# Patient Record
Sex: FEMALE | Race: WHITE | NOT HISPANIC OR LATINO | Employment: FULL TIME | URBAN - METROPOLITAN AREA
[De-identification: names, ages, dates, MRNs, and addresses within clinical notes are randomized per-mention and may not be internally consistent; named-entity substitution may affect disease eponyms.]

---

## 2023-11-20 ENCOUNTER — LAB (OUTPATIENT)
Dept: LAB | Facility: CLINIC | Age: 28
End: 2023-11-20
Payer: COMMERCIAL

## 2023-11-20 ENCOUNTER — OFFICE VISIT (OUTPATIENT)
Dept: OBGYN | Facility: CLINIC | Age: 28
End: 2023-11-20
Payer: COMMERCIAL

## 2023-11-20 VITALS
HEIGHT: 65 IN | HEART RATE: 99 BPM | DIASTOLIC BLOOD PRESSURE: 75 MMHG | SYSTOLIC BLOOD PRESSURE: 103 MMHG | BODY MASS INDEX: 21.77 KG/M2 | WEIGHT: 130.7 LBS

## 2023-11-20 DIAGNOSIS — Z11.3 ROUTINE SCREENING FOR STI (SEXUALLY TRANSMITTED INFECTION): ICD-10-CM

## 2023-11-20 DIAGNOSIS — Z00.00 VISIT FOR PREVENTIVE HEALTH EXAMINATION: ICD-10-CM

## 2023-11-20 DIAGNOSIS — Z13.220 SCREENING FOR LIPID DISORDERS: ICD-10-CM

## 2023-11-20 DIAGNOSIS — Z13.29 SCREENING FOR THYROID DISORDER: ICD-10-CM

## 2023-11-20 DIAGNOSIS — M79.7 FIBROMYALGIA: ICD-10-CM

## 2023-11-20 DIAGNOSIS — Z13.0 SCREENING FOR DEFICIENCY ANEMIA: ICD-10-CM

## 2023-11-20 DIAGNOSIS — Z12.4 SCREENING FOR CERVICAL CANCER: ICD-10-CM

## 2023-11-20 DIAGNOSIS — Z00.00 VISIT FOR PREVENTIVE HEALTH EXAMINATION: Primary | ICD-10-CM

## 2023-11-20 DIAGNOSIS — R87.619 ABNORMAL CERVICAL PAPANICOLAOU SMEAR, UNSPECIFIED ABNORMAL PAP FINDING: ICD-10-CM

## 2023-11-20 DIAGNOSIS — F32.89 OTHER DEPRESSION: ICD-10-CM

## 2023-11-20 DIAGNOSIS — R63.0 POOR APPETITE: ICD-10-CM

## 2023-11-20 PROBLEM — F32.A DEPRESSION: Status: ACTIVE | Noted: 2023-11-20

## 2023-11-20 LAB
ALBUMIN SERPL BCG-MCNC: 5.2 G/DL (ref 3.5–5.2)
ALP SERPL-CCNC: 55 U/L (ref 40–150)
ALT SERPL W P-5'-P-CCNC: 10 U/L (ref 0–70)
ANION GAP SERPL CALCULATED.3IONS-SCNC: 12 MMOL/L (ref 7–15)
AST SERPL W P-5'-P-CCNC: 15 U/L (ref 0–45)
BILIRUB SERPL-MCNC: 0.6 MG/DL
BUN SERPL-MCNC: 8.3 MG/DL (ref 6–20)
CALCIUM SERPL-MCNC: 10.1 MG/DL (ref 8.6–10)
CHLORIDE SERPL-SCNC: 102 MMOL/L (ref 98–107)
CHOLEST SERPL-MCNC: 169 MG/DL
CREAT SERPL-MCNC: 0.86 MG/DL (ref 0.51–1.17)
DEPRECATED HCO3 PLAS-SCNC: 25 MMOL/L (ref 22–29)
EGFRCR SERPLBLD CKD-EPI 2021: >90 ML/MIN/1.73M2
ERYTHROCYTE [DISTWIDTH] IN BLOOD BY AUTOMATED COUNT: 11.9 % (ref 10–15)
FASTING STATUS PATIENT QL REPORTED: YES
GLUCOSE SERPL-MCNC: 103 MG/DL (ref 70–99)
GLUCOSE SERPL-MCNC: 103 MG/DL (ref 70–99)
HBA1C MFR BLD: 5.2 %
HCT VFR BLD AUTO: 43.2 % (ref 35–53)
HDLC SERPL-MCNC: 78 MG/DL
HGB BLD-MCNC: 14.9 G/DL (ref 11.7–17.7)
LDLC SERPL CALC-MCNC: 79 MG/DL
MCH RBC QN AUTO: 30.7 PG (ref 26.5–33)
MCHC RBC AUTO-ENTMCNC: 34.5 G/DL (ref 31.5–36.5)
MCV RBC AUTO: 89 FL (ref 78–100)
NONHDLC SERPL-MCNC: 91 MG/DL
PLATELET # BLD AUTO: 271 10E3/UL (ref 150–450)
POTASSIUM SERPL-SCNC: 3.8 MMOL/L (ref 3.4–5.3)
PROT SERPL-MCNC: 7.2 G/DL (ref 6.4–8.3)
RBC # BLD AUTO: 4.85 10E6/UL (ref 3.8–5.9)
SODIUM SERPL-SCNC: 139 MMOL/L (ref 135–145)
TRIGL SERPL-MCNC: 58 MG/DL
TSH SERPL DL<=0.005 MIU/L-ACNC: 0.63 UIU/ML (ref 0.3–4.2)
WBC # BLD AUTO: 10.5 10E3/UL (ref 4–11)

## 2023-11-20 PROCEDURE — 99385 PREV VISIT NEW AGE 18-39: CPT | Performed by: REGISTERED NURSE

## 2023-11-20 PROCEDURE — 87340 HEPATITIS B SURFACE AG IA: CPT

## 2023-11-20 PROCEDURE — 87389 HIV-1 AG W/HIV-1&-2 AB AG IA: CPT

## 2023-11-20 PROCEDURE — 82947 ASSAY GLUCOSE BLOOD QUANT: CPT

## 2023-11-20 PROCEDURE — 85027 COMPLETE CBC AUTOMATED: CPT

## 2023-11-20 PROCEDURE — 36415 COLL VENOUS BLD VENIPUNCTURE: CPT

## 2023-11-20 PROCEDURE — 80053 COMPREHEN METABOLIC PANEL: CPT

## 2023-11-20 PROCEDURE — 86803 HEPATITIS C AB TEST: CPT

## 2023-11-20 PROCEDURE — G0145 SCR C/V CYTO,THINLAYER,RESCR: HCPCS | Performed by: REGISTERED NURSE

## 2023-11-20 PROCEDURE — 83036 HEMOGLOBIN GLYCOSYLATED A1C: CPT

## 2023-11-20 PROCEDURE — 86780 TREPONEMA PALLIDUM: CPT

## 2023-11-20 PROCEDURE — 99213 OFFICE O/P EST LOW 20 MIN: CPT | Mod: 25 | Performed by: REGISTERED NURSE

## 2023-11-20 PROCEDURE — 80061 LIPID PANEL: CPT

## 2023-11-20 PROCEDURE — 84443 ASSAY THYROID STIM HORMONE: CPT

## 2023-11-20 RX ORDER — QUETIAPINE FUMARATE 200 MG/1
200 TABLET, FILM COATED ORAL AT BEDTIME
COMMUNITY
Start: 2023-11-12 | End: 2023-11-20

## 2023-11-20 RX ORDER — QUETIAPINE FUMARATE 200 MG/1
200 TABLET, FILM COATED ORAL AT BEDTIME
Qty: 90 TABLET | Refills: 1 | Status: SHIPPED | OUTPATIENT
Start: 2023-11-20

## 2023-11-20 RX ORDER — HYDROXYZINE HYDROCHLORIDE 25 MG/1
25 TABLET, FILM COATED ORAL
COMMUNITY
Start: 2023-09-02 | End: 2024-04-29

## 2023-11-20 RX ORDER — GABAPENTIN 300 MG/1
300 CAPSULE ORAL 3 TIMES DAILY
Qty: 120 CAPSULE | Refills: 3 | Status: SHIPPED | OUTPATIENT
Start: 2023-11-20

## 2023-11-20 RX ORDER — ONDANSETRON 4 MG/1
4 TABLET, ORALLY DISINTEGRATING ORAL EVERY 8 HOURS PRN
Qty: 90 TABLET | Refills: 1 | Status: SHIPPED | OUTPATIENT
Start: 2023-11-20 | End: 2024-04-29

## 2023-11-20 RX ORDER — GABAPENTIN 300 MG/1
300 CAPSULE ORAL
COMMUNITY
Start: 2023-05-07 | End: 2023-11-20

## 2023-11-20 RX ORDER — METHOCARBAMOL 500 MG/1
500 TABLET, FILM COATED ORAL 3 TIMES DAILY PRN
COMMUNITY
Start: 2023-05-14

## 2023-11-20 RX ORDER — MELOXICAM 15 MG/1
15 TABLET ORAL
COMMUNITY
Start: 2023-11-10 | End: 2024-04-29

## 2023-11-20 RX ORDER — ONDANSETRON 4 MG/1
4 TABLET, ORALLY DISINTEGRATING ORAL
Status: ON HOLD | COMMUNITY
Start: 2023-09-02 | End: 2024-04-30

## 2023-11-20 NOTE — PATIENT INSTRUCTIONS
Thank you for trusting us with your care!     If you need to contact us for questions about:  Symptoms, Scheduling & Medical Questions; Non-urgent (2-3 day response) Christianayaz message, Urgent (needing response today) [phone] (if after 3:30pm next day response)   Prescriptions: Please call your Pharmacy   Billing: [facility] [phone] or [facility]:[phone]      PREVENTIVE HEALTH RECOMMENDATIONS:   Most women need a yearly breast and pelvic exam.    A PAP screen, a test done DURING a pelvic exam, is NO longer recommended yearly.    March 2013, screening guidelines recommended by ACOG for PAP screen are:    1) First pap at age 21.    2) Pap every 3 years until age 30.    3) After age 30, pap every 3 years or Pap with HR HPV screen every 5 years until age 65.  4) Women do NOT need a vaginal Pap screen after a hysterectomy (surgical removal of the uterus) when they have not had cancer.    Exceptions:  1) Yearly pap if HIV+ or immunosuppressed secondary to organ transplant  2) ALENA II-III continue routine screening for 20 years.    I encourage you continue looking for opportunities to choose a healthy lifestyle:       * Choose to eat a heart healthy diet. Check out the FOOD PLATE guidelines at: http://www.choosemyplate.gov/ for helpful hints on weight and cholesterol management.  Balance your caloric intake with exercise to maintain a BMI in the 22 to 26 range. For bone health: Eat calcium-rich foods like yogurt, broccoli or take chewable calcium pills (500 to 600 mg) twice a day with food.       * Exercise for at least an average of 30 minutes a day, 5 days of the week. This will help you control your weight, release stress, and help prevent disease.      * Take a Vitamin D3 supplement daily fall through spring and during summer unless you [id number]' full body sun exposure to skin without sunscreen.      * DO wear sunscreen to prevent skin cancer after the first 15-30 minutes.      * Identify stressors in your  life, find ways to release the stress, and, make time for yourself. PLEASE ask for help if mood changes last longer than two weeks.     * Limit alcohol to one drink per day.  No smoking.  Avoid second hand smoke. If you smoke, ask for help to stop.       *  If you are in a sexual relationship, talk with your partner about possible infection risks and take action to protect yourself from exposure to a sexual infection.    Please request an infection screen for STIs (sexually transmitted infections) if you are less than age 26 OR believe that you may be at risk.     Get a flu shot each year. Get a tetanus shot every 10 years. EVERYONE needs a pertussis (Whooping cough) booster.    See your dentist twice a year for an exam and preventive care cleaning.     Consider the following screen tests:    1) cholesterol test every 5 years.     2) yearly mammogram after age 40 unless you have identified risks.    3) colonoscopy every 10 years after age 50 unless you have identified risks.    4) diabetes blood test screening if you are at risk for diabetes.      Additional information that you may also find helpful:  The Internet now gives us access to LOTS of information -- some of it helpful, research documented and also plenty of harmful, anecdotal information that may not pertain to your situtaion. Consider visiting the following websites for accurate health information:    www.vitamindcouncil.org/ : Info and ongoing research re Vitamin D    www.fairview.org : Up to date and easily searchable information on multiple topics.    www.medlineplus.gov : medication info, interactive tutorials, watch real surgeries online    www.cdc.gov : public health info, travel advisories, epidemics (H1N1)    www.darshana/std.org: current research re diagnosis, treatment and prevention of sexually contacted infections.    www.health.state.[place] : [place] dept of heatlh, public health issues in [place], N1N1    www.familydoctor.org : good info from the Academy  of Family Physicians

## 2023-11-20 NOTE — PROGRESS NOTES
"  Progress Note    SUBJECTIVE:  Marck Fernandez is an 28 year old, they/them pronouns. No obstetric history on file., who requests an Annual Preventive Exam.     Moved back in September from [place]!     GYN history:   LMP: 11/13/2023  Regular menses? Yes. Menses monthly. Length of menses: 5-6 days  Sexually active? Yes, female partners only.   STI screening? No concerns. Is agreeable to gc/ct today.  Contraception? None. Does not want to be on hormonal contraception.     Cervical cancer screening: unsure, patient thinks 4-5 years ago.     Concerns today include:   - GI symptoms: They had a break up 3 months ago and has had extreme difficulty with food/drink intake since. They report losing 20lbs in the last 3 months. Symptoms include: nausea, diarrhea and aversion to foods. They are able to tolerate only about 1 meal per day. Sx seem to worsen at night especially. Has zofran which helps some and also uses cannabis periodically as an appetite stimulant, but doesn't want to be dependant upon this. They are aware of the psychosomatic component to the GI distress and loss of appetite and have been looking for a therapist but haven't found one. Open to suggestions. Has tried a myraid of antidepressant medications without success. SNRIs were the best, but had debilitating \"brain zaps\" even when taken strictly on time. Does have support of friends and family which is why they moved back to [place].     - Reviewed hx of fibromyalgia: Uses daily gabapentin, robaxin and  meloxicam for management. Would like to establish with a provider who can continue to manage this condition with her.    - Reviewed they take Seroquel for sleep and mood stabilization which helps. Does not have a provider to manage these medications.     - S/p bilateral mastectomy 12/2018, gender affirming    Age-based screening recommendations:   Cholesterol screening: Lipid panel  Diabetes screening: Hgb A1c; fasting glucose  Thyroid disease screening: TSH " "w/reflex T4    Menstrual History:      11/20/2023     2:00 PM   Menstrual History   LAST MENSTRUAL PERIOD 11/13/2023       Last  No results found for: \"PAP\"  History of abnormal Pap smear: YES - updated in Problem List and Health Maintenance accordingly    Last No results found for: \"HPV16\"  Last No results found for: \"HPV18\"  Last No results found for: \"HRHPV\"    Mammogram current: not applicable  Last Mammogram:   No results found.     Last Colonoscopy:  No results found for this or any previous visit.      HISTORY:  meloxicam (MOBIC) 15 MG tablet, Take 15 mg by mouth  methocarbamol (ROBAXIN) 500 MG tablet, Take 500 mg by mouth  ondansetron (ZOFRAN ODT) 4 MG ODT tab, Take 4 mg by mouth  hydrOXYzine (ATARAX) 25 MG tablet, Take 25 mg by mouth (Patient not taking: Reported on 11/20/2023)    No current facility-administered medications on file prior to visit.    Allergies   Allergen Reactions    Codeine Itching     Immunization History   Administered Date(s) Administered    COVID-19 12+ (2023-24) (MODERNA) 10/02/2023    Influenza,INJ,MDCK,PF,Quad >6mo(Flucelvax) 10/02/2023       OB History   No obstetric history on file.     Past Medical History:   Diagnosis Date    Abnormal Pap smear of cervix     ADHD (attention deficit hyperactivity disorder)     Anxiety     Depression 11/20/2023    Eating disorder     Fibromyalgia 11/20/2023    Irritable bowel syndrome     Trichotillomania      Past Surgical History:   Procedure Laterality Date    top surgery/ double masectomy Bilateral      Family History   Problem Relation Age of Onset    Joint hypermobility Mother     Joint hypermobility Father     Spine Problems Father     Alzheimer Disease Maternal Grandmother     Coronary Artery Disease Maternal Grandfather      Social History     Socioeconomic History    Marital status: Single       ROS  Negative other than noted in HPI       No data to display                   No data to display                  EXAM:  Blood pressure " "103/75, pulse 99, height 1.651 m (5' 5\"), weight 59.3 kg (130 lb 11.2 oz), last menstrual period 11/13/2023. Body mass index is 21.75 kg/m .  General - pleasant female in no acute distress.  Skin - no suspicious lesions or rashes  EENT-  PERRLA, euthyroid with out palpable nodules  Neck - supple without lymphadenopathy.  Lungs - clear to auscultation bilaterally.  Heart - regular rate and rhythm without murmur.  Abdomen - soft, nontender, nondistended, no masses or organomegaly noted.  Musculoskeletal - no gross deformities.  Neurological - normal strength, sensation, and mental status.    Chest Exam:  S/p double mastectomy. Scars well healed. Chest exam without visible skin changes, non-tender with palpation. No dimpling or lesions seen. No nipple discharge. Axillary nodes negative.      Pelvic Exam:  EG/BUS: Normal genital architecture without lesions, erythema or abnormal secretions Bartholin's, Urethra, [place]'s normal  Urethral meatus: normal   Urethra: no masses, tenderness, or scarring   Vagina: moist, pink, rugae with creamy, white, and odorless  secretions  Cervix: pink, moist, closed, without lesion or CMT. Pap collected.   Uterus: bimanual exam deferred. No concerns.   Adnexa: bimanual exam deferred. No concerns.   Rectum:anus normal       ASSESSMENT:  Encounter Diagnoses   Name Primary?    Visit for preventive health examination Yes    Fibromyalgia     Abnormal cervical Papanicolaou smear, unspecified abnormal pap finding     Screening for cervical cancer     Screening for lipid disorders     Routine screening for STI (sexually transmitted infection)     Poor appetite     Screening for thyroid disorder     Other depression     Screening for deficiency anemia         PLAN:   Orders Placed This Encounter   Procedures    Obtaining, preparing and conveyance of cervical or vaginal smear to laboratory.    Lipid Profile    Glucose    Hemoglobin A1c    TSH with free T4 reflex    Comprehensive metabolic panel    " HIV Antigen Antibody Combo    Hepatitis B surface antigen    Hepatitis C antibody    Treponema Abs w Reflex to RPR and Titer    CBC with Platelets    Adult Rheumatology  Referral    Adult Mental Health  Referral     Orders Placed This Encounter   Medications    DISCONTD: gabapentin (NEURONTIN) 300 MG capsule     Sig: Take 300 mg by mouth    hydrOXYzine (ATARAX) 25 MG tablet     Sig: Take 25 mg by mouth    methocarbamol (ROBAXIN) 500 MG tablet     Sig: Take 500 mg by mouth    DISCONTD: QUEtiapine (SEROQUEL) 200 MG tablet     Sig: Take 200 mg by mouth at bedtime Pt takes half of a pill    ondansetron (ZOFRAN ODT) 4 MG ODT tab     Sig: Take 4 mg by mouth    meloxicam (MOBIC) 15 MG tablet     Sig: Take 15 mg by mouth    ondansetron (ZOFRAN ODT) 4 MG ODT tab     Sig: Take 1 tablet (4 mg) by mouth every 8 hours as needed for nausea     Dispense:  90 tablet     Refill:  1    QUEtiapine (SEROQUEL) 200 MG tablet     Sig: Take 1 tablet (200 mg) by mouth at bedtime Pt takes half of a pill     Dispense:  90 tablet     Refill:  1    gabapentin (NEURONTIN) 300 MG capsule     Sig: Take 1 capsule (300 mg) by mouth 3 times daily     Dispense:  120 capsule     Refill:  3     - To lab for screening labs and r/o anemia, hypothyroid or liver dysfunction as source of symptoms. Will follow up based on results.  - Refills provided for medications per patient request to bridge establishing with psychiatrist and therapist. Mental health referral placed and encouraged to schedule with Dr. Palomares if available for therapy.   - Establish with Dr. Moreno or family practice/internal medicine for fibromyalgia management. Also placed referral for rheumatology to establish care if they prefer.   - Discussed mental health symptoms and emotional support provided. Encouraged use of zofran PRN for nausea symptoms and trying small frequent meals/snacking rather than large meals to try and get calories that way. Mood follow up 1 month to  check in.       SHARI JohnsonM

## 2023-11-20 NOTE — NURSING NOTE
Specimen collection error:    Gonorrhea and chlamydia specimen was not collected today. I called the lab to cancel the order.

## 2023-11-21 ENCOUNTER — MYC MEDICAL ADVICE (OUTPATIENT)
Dept: OBGYN | Facility: CLINIC | Age: 28
End: 2023-11-21
Payer: COMMERCIAL

## 2023-11-21 LAB
HBV SURFACE AG SERPL QL IA: NONREACTIVE
HCV AB SERPL QL IA: NONREACTIVE
HIV 1+2 AB+HIV1 P24 AG SERPL QL IA: NONREACTIVE
T PALLIDUM AB SER QL: NONREACTIVE

## 2023-11-23 ENCOUNTER — MYC MEDICAL ADVICE (OUTPATIENT)
Dept: OBGYN | Facility: CLINIC | Age: 28
End: 2023-11-23
Payer: COMMERCIAL

## 2023-11-24 LAB
BKR LAB AP GYN ADEQUACY: NORMAL
BKR LAB AP GYN INTERPRETATION: NORMAL
BKR LAB AP HPV REFLEX: NORMAL
BKR LAB AP PREVIOUS ABNORMAL: NORMAL
PATH REPORT.COMMENTS IMP SPEC: NORMAL
PATH REPORT.COMMENTS IMP SPEC: NORMAL
PATH REPORT.RELEVANT HX SPEC: NORMAL

## 2024-04-29 ENCOUNTER — OFFICE VISIT (OUTPATIENT)
Dept: FAMILY MEDICINE | Facility: CLINIC | Age: 29
End: 2024-04-29
Payer: COMMERCIAL

## 2024-04-29 VITALS
DIASTOLIC BLOOD PRESSURE: 74 MMHG | HEIGHT: 65 IN | OXYGEN SATURATION: 99 % | BODY MASS INDEX: 22.82 KG/M2 | TEMPERATURE: 97.9 F | RESPIRATION RATE: 18 BRPM | SYSTOLIC BLOOD PRESSURE: 119 MMHG | WEIGHT: 137 LBS | HEART RATE: 66 BPM

## 2024-04-29 DIAGNOSIS — Z23 NEED FOR HEPATITIS B VACCINATION: ICD-10-CM

## 2024-04-29 DIAGNOSIS — R11.0 NAUSEA: ICD-10-CM

## 2024-04-29 DIAGNOSIS — F41.9 ANXIETY: ICD-10-CM

## 2024-04-29 DIAGNOSIS — M79.7 FIBROMYALGIA: ICD-10-CM

## 2024-04-29 DIAGNOSIS — Z23 NEED FOR DIPHTHERIA-TETANUS-PERTUSSIS (TDAP) VACCINE: ICD-10-CM

## 2024-04-29 DIAGNOSIS — M25.572 CHRONIC PAIN OF LEFT ANKLE: Primary | ICD-10-CM

## 2024-04-29 DIAGNOSIS — G89.29 CHRONIC PAIN OF LEFT ANKLE: Primary | ICD-10-CM

## 2024-04-29 DIAGNOSIS — F32.89 OTHER DEPRESSION: ICD-10-CM

## 2024-04-29 DIAGNOSIS — F90.9 ATTENTION DEFICIT HYPERACTIVITY DISORDER (ADHD), UNSPECIFIED ADHD TYPE: ICD-10-CM

## 2024-04-29 PROCEDURE — 99204 OFFICE O/P NEW MOD 45 MIN: CPT | Mod: 25 | Performed by: STUDENT IN AN ORGANIZED HEALTH CARE EDUCATION/TRAINING PROGRAM

## 2024-04-29 PROCEDURE — 90746 HEPB VACCINE 3 DOSE ADULT IM: CPT | Performed by: STUDENT IN AN ORGANIZED HEALTH CARE EDUCATION/TRAINING PROGRAM

## 2024-04-29 PROCEDURE — 90471 IMMUNIZATION ADMIN: CPT | Performed by: STUDENT IN AN ORGANIZED HEALTH CARE EDUCATION/TRAINING PROGRAM

## 2024-04-29 RX ORDER — MELOXICAM 15 MG/1
15 TABLET ORAL DAILY PRN
Qty: 30 TABLET | Refills: 3 | Status: SHIPPED | OUTPATIENT
Start: 2024-04-29 | End: 2024-09-03

## 2024-04-29 RX ORDER — QUETIAPINE FUMARATE 100 MG/1
150 TABLET, FILM COATED ORAL EVERY OTHER DAY
COMMUNITY
Start: 2024-04-03

## 2024-04-29 RX ORDER — ONDANSETRON 4 MG/1
4 TABLET, ORALLY DISINTEGRATING ORAL EVERY 8 HOURS PRN
Qty: 90 TABLET | Refills: 1 | Status: SHIPPED | OUTPATIENT
Start: 2024-04-29

## 2024-04-29 RX ORDER — LORAZEPAM 0.5 MG/1
TABLET ORAL
COMMUNITY
Start: 2024-04-03

## 2024-04-29 NOTE — COMMUNITY RESOURCES LIST (ENGLISH)
April 29, 2024           YOUR PERSONALIZED LIST OF SERVICES & PROGRAMS           NAVIGATION    Eligibility Screening      [facility] - SNAP application assistance  [address] [place] [address] (Distance: 1.7 miles)  Phone: (781) 109-2112  Website: http://www.[facility]worker [facility]/human-services/multi-cultural-services  Language: English, Tajik, Gibraltarian, Iraqi, Tanzanian, Georgian, Bahraini, Tamazight, Nepali, Kyrgyz, Swahili  Fee: Free  Accessibility: Ada accessible, Translation services, Deaf or hard of hearing      [facility]  - Public benefits application assistance  [address] [place] [address] (Distance: 1.7 miles)  Phone: (998) 120-9020  Website: http://www.[employer]/[facility]/human-services/multi-cultural-services  Language: English, Tajik, Gibraltarian, Iraqi, Tanzanian, Georgian, Bahraini, Tamazight, Nepali, Kyrgyz, Swahili  Fee: Free  Accessibility: Ada accessible, Translation services, Deaf or hard of hearing      [employer]  Phone: (821) 922-6848  Website: https://www.[facility].org/about-us/assister-program/navigators/index.jsp  Language: English  Hours: Mon 8:00 AM - 4:00 PM Tue 8:00 AM - 4:00 PM Wed 8:00 AM - 4:00 PM Thu 8:00 AM - 4:00 PM        ASSISTANCE    Nutrition Benefits      [facility] application assistance  [address] [place] [address] (Distance: 1.7 miles)  Phone: (529) [phone]  Website: http://www.[facility] [facility]/human-services/multi-cultural-services  Language: English, Tajik, Gibraltarian, Iraqi, Tanzanian, Georgian, Bahraini, Tamazight, Nepali, Kyrgyz, Swahili  Fee: Free  Accessibility: Ada accessible, Translation services, Deaf or hard of hearing      [facility] application assistance - [address] [address] (Distance: 1.7 miles)  Phone: (125) [phone]  Language: English  Fee: Free  Accessibility: Translation services, Ada accessible      [facility]  (formerly food stamps) Screening and Application help  Phone: (513) 545-4337  Website: https://www.hungersolutions.org/programs/[place]-food-helpline/  Language: English  Hours: Mon 10:00 AM - 5:00 PM Tue 10:00 AM - 5:00 PM Wed 10:00 AM - 5:00 PM Thu 10:00 AM - 5:00 PM Fri 10:00 AM - 5:00 PM  Fee: Free  Accessibility: Ada accessible, Blind accommodation, Deaf or hard of hearing, Translation services    Pantry      [facility] - Food pantry  [address] [place] [address] (Distance: 3.7 miles)  Phone: (531) 171-2175  Website: http://www.saintolaf.org/  Language: English  Fee: Free  Accessibility: Ada accessible      [employer] - Food pantry  [address] [place] [address] (Distance: 6.6 miles)  Phone: (106) 792-2373  Website: http://www.[employer].org/  Language: English, Danish  Fee: Free  Accessibility: Ada accessible      [employer]  Phone: (974) 990-5315  Website: https://www.Fishbowl.org/empowerment-food-bank  Language: English  Hours: Mon 9:00 AM - 5:00 PM Tue 9:00 AM - 5:00 PM Wed 9:00 AM - 5:00 PM Thu 9:00 AM - 5:00 PM Fri 9:00 AM - 5:00 PM  Fee: Free               IMPORTANT NUMBERS & WEBSITES        Emergency Services  911  .   [facility]  211 http://211unitedway.org  .   Poison Control  (580) 454-7006 http://[place]poison.org http://wisconsinpoison.org  .     Suicide and Crisis Lifeline  988 http://988lifeline.org  .   Childhelp National Child Abuse Hotline  967.714.6729 http://Childhelphotline.org   .   National Sexual Assault Hotline  (836) 338-7039 (HOPE) http://Rainn.org   .     National Runaway Safeline  (407) 746-1346 (RUNAWAY) http://1800runaOZON.ru.org  .   Pregnancy & Postpartum Support  Call/text 310-743-0495  [place]: http://ppsupportmn.org  [place]: http://[employer].com/wi  .   Substance Abuse National Helpline ([facility])  800-622-HELP (0353) http://Findtreatment.gov   .                DISCLAIMER: These resources have been  generated via the [employer] Platform. [employer]ud does not endorse any service providers mentioned in this resource list. [employer] does not guarantee that the services mentioned in this resource list will be available to you or will improve your health or wellness.    [facility]

## 2024-04-29 NOTE — PROGRESS NOTES
Marck was seen today for office visit.    Diagnoses and all orders for this visit:    Chronic pain of left ankle  Comments:  Sprained ankle approximately a year ago.  Intermittent flareup of pain.  No recent trauma or injury.  Pain is currently controlled.  Low suspicion for bone fracture.  Would like referral to PT.  Try ankle brace.  Orders:  -     REVIEW OF HEALTH MAINTENANCE PROTOCOL ORDERS  -     meloxicam (MOBIC) 15 MG tablet; Take 1 tablet (15 mg) by mouth daily as needed for moderate pain or pain  -     Physical Therapy  Referral; Future    Nausea  Comments:  Reports gut symptoms, poor appetite and nausea, 2/2 trauma/stress.  Orders:  -     ondansetron (ZOFRAN ODT) 4 MG ODT tab; Take 1 tablet (4 mg) by mouth every 8 hours as needed for nausea    Need for hepatitis B vaccination  -     HEPATITIS B, ADULT 20+ (ENGERIX-B/RECOMBIVAX HB)    Need for diphtheria-tetanus-pertussis (Tdap) vaccine  Comments:  Would like to defer.  Will make MA appointment.  Orders:  -     Cancel: TDAP 10-64Y (ADACEL,BOOSTRIX)  -     TDAP 7+ (ADACEL,BOOSTRIX); Future    Anxiety  Other depression  Attention deficit hyperactivity disorder (ADHD), unspecified ADHD type  Comments:  Follows with psychiatrist and therapist.  On quetiapine and lorazepam.  Signed LAURA for psychiatrist and therapist.    Fibromyalgia  Comments:  Muscle relaxer as needed.          Subjective   Marck is a 28 year old, presenting for the following health issues:  office visit ([facility], referral for pt, left ankle pain)        4/29/2024    11:00 AM   Additional Questions   Roomed by    Accompanied by self     History of Present Illness       Reason for visit:  [facility]    Marck eats 2-3 servings of fruits and vegetables daily.Marck consumes 1 sweetened beverage(s) daily.Marck exercises with enough effort to increase Marck's heart rate 30 to 60 minutes per day.  Marck exercises with enough effort to increase Marck's heart rate 3 or less days per week.  "  Marck is taking medications regularly.     Left ankle pain  1 year ago, stepped off moving truck, was bruised and was on crutches for a biet, never saw a doctor.   Would like to see PT  Does not think she broke any bones, but didn't get imaging.   No recent trauma or injury    PMH  Fibromyalgia dx 2018  OCD trichotillomania  Anxiety   Depression  ADHD  S/p top surgery 2019 - used to be on testosterone, no longer    Quetiapine 100 or 200mg based on sleep.   Lorazepam prn    Sees psychiatrist and therapy          Objective    /74 (BP Location: Left arm, Patient Position: Sitting, Cuff Size: Adult Regular)   Pulse 66   Temp 97.9  F (36.6  C) (Temporal)   Resp 18   Ht 1.66 m (5' 5.35\")   Wt 62.1 kg (137 lb)   LMP 04/15/2024   SpO2 99%   BMI 22.55 kg/m    Body mass index is 22.55 kg/m .  Physical Exam   General: Well developed, well nourished.  Skin:  Dry without rash.    Head:  Normocephalic-atraumatic.    Eye:  Normal conjunctivae.     Respiratory:  Normal respiratory effort.   Gastrointestinal:  Non-distended.    Musculoskeletal:  No deformity or edema.  Left ankle with normal range of motion, no tenderness to palpation of lateral malleolus, base of fifth metatarsal, posterior edge of medial malleolus, navicular bone.  Neurologic: No focal deficits.          Signed Electronically by: Soni Boyer MD    Prior to immunization administration, verified patients identity using patient s name and date of birth. Please see Immunization Activity for additional information.     Screening Questionnaire for Adult Immunization    Are you sick today?   No   Do you have allergies to medications, food, a vaccine component or latex?   Yes   Have you ever had a serious reaction after receiving a vaccination?   No   Do you have a long-term health problem with heart, lung, kidney, or metabolic disease (e.g., diabetes), asthma, a blood disorder, no spleen, complement component deficiency, a cochlear implant, or a spinal fluid " leak?  Are you on long-term aspirin therapy?   No   Do you have cancer, leukemia, HIV/AIDS, or any other immune system problem?   No   Do you have a parent, brother, or sister with an immune system problem?   No   In the past 3 months, have you taken medications that affect  your immune system, such as prednisone, other steroids, or anticancer drugs; drugs for the treatment of rheumatoid arthritis, Crohn s disease, or psoriasis; or have you had radiation treatments?   No   Have you had a seizure, or a brain or other nervous system problem?   No   During the past year, have you received a transfusion of blood or blood    products, or been given immune (gamma) globulin or antiviral drug?   No   For women: Are you pregnant or is there a chance you could become       pregnant during the next month?   No   Have you received any vaccinations in the past 4 weeks?   No     Immunization questionnaire was positive for at least one answer.  Notified provider.      Patient instructed to remain in clinic for 15 minutes afterwards, and to report any adverse reactions.     Screening performed by Fabian Morgan MA on 4/29/2024 at 11:03 AM.

## 2024-04-30 ENCOUNTER — HOSPITAL ENCOUNTER (INPATIENT)
Facility: CLINIC | Age: 29
Setting detail: SURGERY ADMIT
End: 2024-04-30
Attending: OBSTETRICS & GYNECOLOGY | Admitting: OBSTETRICS & GYNECOLOGY
Payer: COMMERCIAL

## 2024-04-30 ENCOUNTER — HOSPITAL ENCOUNTER (INPATIENT)
Facility: CLINIC | Age: 29
LOS: 1 days | Discharge: HOME | End: 2024-05-01
Attending: STUDENT IN AN ORGANIZED HEALTH CARE EDUCATION/TRAINING PROGRAM | Admitting: OBSTETRICS & GYNECOLOGY
Payer: COMMERCIAL

## 2024-04-30 ENCOUNTER — ANESTHESIA (OUTPATIENT)
Dept: SURGERY | Facility: CLINIC | Age: 29
End: 2024-04-30
Payer: COMMERCIAL

## 2024-04-30 ENCOUNTER — NURSE TRIAGE (OUTPATIENT)
Dept: NURSING | Facility: CLINIC | Age: 29
End: 2024-04-30
Payer: COMMERCIAL

## 2024-04-30 ENCOUNTER — ANESTHESIA EVENT (OUTPATIENT)
Dept: SURGERY | Facility: CLINIC | Age: 29
End: 2024-04-30
Payer: COMMERCIAL

## 2024-04-30 ENCOUNTER — APPOINTMENT (OUTPATIENT)
Dept: CT IMAGING | Facility: CLINIC | Age: 29
End: 2024-04-30
Attending: STUDENT IN AN ORGANIZED HEALTH CARE EDUCATION/TRAINING PROGRAM
Payer: COMMERCIAL

## 2024-04-30 DIAGNOSIS — N83.209 RUPTURED OVARIAN CYST: Primary | ICD-10-CM

## 2024-04-30 LAB
ABO/RH(D): NORMAL
ALBUMIN SERPL BCG-MCNC: 4.4 G/DL (ref 3.5–5.2)
ALP SERPL-CCNC: 48 U/L (ref 40–150)
ALT SERPL W P-5'-P-CCNC: 9 U/L (ref 0–70)
ANION GAP SERPL CALCULATED.3IONS-SCNC: 13 MMOL/L (ref 7–15)
ANTIBODY SCREEN: NEGATIVE
APTT PPP: 24 SECONDS (ref 22–38)
AST SERPL W P-5'-P-CCNC: 15 U/L (ref 0–45)
BASE EXCESS BLDV CALC-SCNC: -3 MMOL/L (ref -3–3)
BASOPHILS # BLD AUTO: 0.1 10E3/UL (ref 0–0.2)
BASOPHILS NFR BLD AUTO: 0 %
BILIRUB SERPL-MCNC: 0.4 MG/DL
BLD PROD TYP BPU: NORMAL
BLOOD COMPONENT TYPE: NORMAL
BUN SERPL-MCNC: 14.3 MG/DL (ref 6–20)
CA-I BLD-MCNC: 4.9 MG/DL (ref 4.4–5.2)
CALCIUM SERPL-MCNC: 8.7 MG/DL (ref 8.6–10)
CHLORIDE SERPL-SCNC: 102 MMOL/L (ref 98–107)
CODING SYSTEM: NORMAL
CPB POCT: NO
CREAT SERPL-MCNC: 0.82 MG/DL (ref 0.51–1.17)
DEPRECATED HCO3 PLAS-SCNC: 23 MMOL/L (ref 22–29)
EGFRCR SERPLBLD CKD-EPI 2021: >90 ML/MIN/1.73M2
EOSINOPHIL # BLD AUTO: 0.1 10E3/UL (ref 0–0.7)
EOSINOPHIL NFR BLD AUTO: 1 %
ERYTHROCYTE [DISTWIDTH] IN BLOOD BY AUTOMATED COUNT: 11.9 % (ref 10–15)
ERYTHROCYTE [DISTWIDTH] IN BLOOD BY AUTOMATED COUNT: 12.9 % (ref 10–15)
FIBRINOGEN PPP-MCNC: 205 MG/DL (ref 170–490)
GLUCOSE BLD-MCNC: 152 MG/DL (ref 70–99)
GLUCOSE SERPL-MCNC: 161 MG/DL (ref 70–99)
HCO3 BLDV-SCNC: 24 MMOL/L (ref 21–28)
HCT VFR BLD AUTO: 33.3 % (ref 35–53)
HCT VFR BLD AUTO: 36.8 % (ref 35–53)
HCT VFR BLD CALC: 36 % (ref 35–53)
HGB BLD-MCNC: 11.3 G/DL (ref 13.3–17.7)
HGB BLD-MCNC: 12.2 G/DL (ref 11.7–17.7)
HGB BLD-MCNC: 12.4 G/DL (ref 13.3–17.7)
IMM GRANULOCYTES # BLD: 0.1 10E3/UL
IMM GRANULOCYTES NFR BLD: 1 %
INR PPP: 1.12 (ref 0.85–1.15)
ISSUE DATE AND TIME: NORMAL
LACTATE SERPL-SCNC: 1.7 MMOL/L (ref 0.7–2)
LACTATE SERPL-SCNC: 2.7 MMOL/L (ref 0.7–2)
LIPASE SERPL-CCNC: 16 U/L (ref 13–60)
LYMPHOCYTES # BLD AUTO: 2.7 10E3/UL (ref 0.8–5.3)
LYMPHOCYTES NFR BLD AUTO: 18 %
MCH RBC QN AUTO: 29.9 PG (ref 26.5–33)
MCH RBC QN AUTO: 30.2 PG (ref 26.5–33)
MCHC RBC AUTO-ENTMCNC: 33.7 G/DL (ref 31.5–36.5)
MCHC RBC AUTO-ENTMCNC: 33.9 G/DL (ref 31.5–36.5)
MCV RBC AUTO: 88 FL (ref 78–100)
MCV RBC AUTO: 90 FL (ref 78–100)
MONOCYTES # BLD AUTO: 0.7 10E3/UL (ref 0–1.3)
MONOCYTES NFR BLD AUTO: 5 %
NEUTROPHILS # BLD AUTO: 10.9 10E3/UL (ref 1.6–8.3)
NEUTROPHILS NFR BLD AUTO: 75 %
NRBC # BLD AUTO: 0 10E3/UL
NRBC BLD AUTO-RTO: 0 /100
PCO2 BLDV: 46 MM HG (ref 40–50)
PH BLDV: 7.32 [PH] (ref 7.32–7.43)
PLATELET # BLD AUTO: 172 10E3/UL (ref 150–450)
PLATELET # BLD AUTO: 235 10E3/UL (ref 150–450)
PO2 BLDV: 26 MM HG (ref 25–47)
POTASSIUM BLD-SCNC: 3.5 MMOL/L (ref 3.4–5.3)
POTASSIUM SERPL-SCNC: 3.5 MMOL/L (ref 3.4–5.3)
PROCALCITONIN SERPL IA-MCNC: 0.03 NG/ML
PROT SERPL-MCNC: 6.2 G/DL (ref 6.4–8.3)
RADIOLOGIST FLAGS: ABNORMAL
RBC # BLD AUTO: 3.78 10E6/UL (ref 3.8–5.9)
RBC # BLD AUTO: 4.1 10E6/UL (ref 3.8–5.9)
SAO2 % BLDV: 42 % (ref 70–75)
SODIUM BLD-SCNC: 139 MMOL/L (ref 135–145)
SODIUM SERPL-SCNC: 138 MMOL/L (ref 135–145)
SPECIMEN EXPIRATION DATE: NORMAL
UNIT ABO/RH: NORMAL
UNIT NUMBER: NORMAL
UNIT STATUS: NORMAL
UNIT TYPE ISBT: 9500
WBC # BLD AUTO: 14.5 10E3/UL (ref 4–11)
WBC # BLD AUTO: 17.8 10E3/UL (ref 4–11)

## 2024-04-30 PROCEDURE — 250N000009 HC RX 250: Performed by: STUDENT IN AN ORGANIZED HEALTH CARE EDUCATION/TRAINING PROGRAM

## 2024-04-30 PROCEDURE — 250N000013 HC RX MED GY IP 250 OP 250 PS 637

## 2024-04-30 PROCEDURE — 85025 COMPLETE CBC W/AUTO DIFF WBC: CPT | Performed by: STUDENT IN AN ORGANIZED HEALTH CARE EDUCATION/TRAINING PROGRAM

## 2024-04-30 PROCEDURE — 96365 THER/PROPH/DIAG IV INF INIT: CPT | Performed by: STUDENT IN AN ORGANIZED HEALTH CARE EDUCATION/TRAINING PROGRAM

## 2024-04-30 PROCEDURE — 87040 BLOOD CULTURE FOR BACTERIA: CPT | Performed by: STUDENT IN AN ORGANIZED HEALTH CARE EDUCATION/TRAINING PROGRAM

## 2024-04-30 PROCEDURE — 999N000141 HC STATISTIC PRE-PROCEDURE NURSING ASSESSMENT: Performed by: OBSTETRICS & GYNECOLOGY

## 2024-04-30 PROCEDURE — 0W9G3ZZ DRAINAGE OF PERITONEAL CAVITY, PERCUTANEOUS APPROACH: ICD-10-PCS | Performed by: OBSTETRICS & GYNECOLOGY

## 2024-04-30 PROCEDURE — 96375 TX/PRO/DX INJ NEW DRUG ADDON: CPT | Performed by: STUDENT IN AN ORGANIZED HEALTH CARE EDUCATION/TRAINING PROGRAM

## 2024-04-30 PROCEDURE — 258N000003 HC RX IP 258 OP 636: Performed by: ANESTHESIOLOGY

## 2024-04-30 PROCEDURE — 76705 ECHO EXAM OF ABDOMEN: CPT | Mod: 26 | Performed by: STUDENT IN AN ORGANIZED HEALTH CARE EDUCATION/TRAINING PROGRAM

## 2024-04-30 PROCEDURE — 83605 ASSAY OF LACTIC ACID: CPT | Performed by: STUDENT IN AN ORGANIZED HEALTH CARE EDUCATION/TRAINING PROGRAM

## 2024-04-30 PROCEDURE — 250N000009 HC RX 250: Performed by: NURSE ANESTHETIST, CERTIFIED REGISTERED

## 2024-04-30 PROCEDURE — 99291 CRITICAL CARE FIRST HOUR: CPT | Mod: 25 | Performed by: STUDENT IN AN ORGANIZED HEALTH CARE EDUCATION/TRAINING PROGRAM

## 2024-04-30 PROCEDURE — 49322 LAPAROSCOPY ASPIRATION: CPT | Performed by: ANESTHESIOLOGY

## 2024-04-30 PROCEDURE — 82040 ASSAY OF SERUM ALBUMIN: CPT | Performed by: STUDENT IN AN ORGANIZED HEALTH CARE EDUCATION/TRAINING PROGRAM

## 2024-04-30 PROCEDURE — P9016 RBC LEUKOCYTES REDUCED: HCPCS

## 2024-04-30 PROCEDURE — 250N000011 HC RX IP 250 OP 636: Performed by: ANESTHESIOLOGY

## 2024-04-30 PROCEDURE — 370N000017 HC ANESTHESIA TECHNICAL FEE, PER MIN: Performed by: OBSTETRICS & GYNECOLOGY

## 2024-04-30 PROCEDURE — 76705 ECHO EXAM OF ABDOMEN: CPT | Performed by: STUDENT IN AN ORGANIZED HEALTH CARE EDUCATION/TRAINING PROGRAM

## 2024-04-30 PROCEDURE — 250N000011 HC RX IP 250 OP 636: Performed by: STUDENT IN AN ORGANIZED HEALTH CARE EDUCATION/TRAINING PROGRAM

## 2024-04-30 PROCEDURE — 85384 FIBRINOGEN ACTIVITY: CPT | Performed by: STUDENT IN AN ORGANIZED HEALTH CARE EDUCATION/TRAINING PROGRAM

## 2024-04-30 PROCEDURE — 74177 CT ABD & PELVIS W/CONTRAST: CPT

## 2024-04-30 PROCEDURE — 360N000077 HC SURGERY LEVEL 4, PER MIN: Performed by: OBSTETRICS & GYNECOLOGY

## 2024-04-30 PROCEDURE — 0WCG4ZZ EXTIRPATION OF MATTER FROM PERITONEAL CAVITY, PERCUTANEOUS ENDOSCOPIC APPROACH: ICD-10-PCS | Performed by: OBSTETRICS & GYNECOLOGY

## 2024-04-30 PROCEDURE — 250N000013 HC RX MED GY IP 250 OP 250 PS 637: Performed by: STUDENT IN AN ORGANIZED HEALTH CARE EDUCATION/TRAINING PROGRAM

## 2024-04-30 PROCEDURE — 86900 BLOOD TYPING SEROLOGIC ABO: CPT | Performed by: STUDENT IN AN ORGANIZED HEALTH CARE EDUCATION/TRAINING PROGRAM

## 2024-04-30 PROCEDURE — 0W3R4ZZ CONTROL BLEEDING IN GENITOURINARY TRACT, PERCUTANEOUS ENDOSCOPIC APPROACH: ICD-10-PCS | Performed by: OBSTETRICS & GYNECOLOGY

## 2024-04-30 PROCEDURE — 250N000025 HC SEVOFLURANE, PER MIN: Performed by: OBSTETRICS & GYNECOLOGY

## 2024-04-30 PROCEDURE — 710N000009 HC RECOVERY PHASE 1, LEVEL 1, PER MIN: Performed by: OBSTETRICS & GYNECOLOGY

## 2024-04-30 PROCEDURE — 84145 PROCALCITONIN (PCT): CPT | Performed by: STUDENT IN AN ORGANIZED HEALTH CARE EDUCATION/TRAINING PROGRAM

## 2024-04-30 PROCEDURE — 99140 ANES COMP EMERGENCY COND: CPT | Performed by: ANESTHESIOLOGY

## 2024-04-30 PROCEDURE — 36415 COLL VENOUS BLD VENIPUNCTURE: CPT | Performed by: STUDENT IN AN ORGANIZED HEALTH CARE EDUCATION/TRAINING PROGRAM

## 2024-04-30 PROCEDURE — 82803 BLOOD GASES ANY COMBINATION: CPT

## 2024-04-30 PROCEDURE — 250N000011 HC RX IP 250 OP 636: Performed by: NURSE ANESTHETIST, CERTIFIED REGISTERED

## 2024-04-30 PROCEDURE — 250N000011 HC RX IP 250 OP 636: Performed by: OBSTETRICS & GYNECOLOGY

## 2024-04-30 PROCEDURE — 84155 ASSAY OF PROTEIN SERUM: CPT | Performed by: STUDENT IN AN ORGANIZED HEALTH CARE EDUCATION/TRAINING PROGRAM

## 2024-04-30 PROCEDURE — 258N000003 HC RX IP 258 OP 636: Performed by: NURSE ANESTHETIST, CERTIFIED REGISTERED

## 2024-04-30 PROCEDURE — 36430 TRANSFUSION BLD/BLD COMPNT: CPT | Performed by: STUDENT IN AN ORGANIZED HEALTH CARE EDUCATION/TRAINING PROGRAM

## 2024-04-30 PROCEDURE — 85027 COMPLETE CBC AUTOMATED: CPT | Performed by: STUDENT IN AN ORGANIZED HEALTH CARE EDUCATION/TRAINING PROGRAM

## 2024-04-30 PROCEDURE — 85730 THROMBOPLASTIN TIME PARTIAL: CPT | Performed by: STUDENT IN AN ORGANIZED HEALTH CARE EDUCATION/TRAINING PROGRAM

## 2024-04-30 PROCEDURE — 96367 TX/PROPH/DG ADDL SEQ IV INF: CPT | Performed by: STUDENT IN AN ORGANIZED HEALTH CARE EDUCATION/TRAINING PROGRAM

## 2024-04-30 PROCEDURE — 49322 LAPAROSCOPY ASPIRATION: CPT | Performed by: NURSE ANESTHETIST, CERTIFIED REGISTERED

## 2024-04-30 PROCEDURE — 272N000001 HC OR GENERAL SUPPLY STERILE: Performed by: OBSTETRICS & GYNECOLOGY

## 2024-04-30 PROCEDURE — 99140 ANES COMP EMERGENCY COND: CPT | Performed by: NURSE ANESTHETIST, CERTIFIED REGISTERED

## 2024-04-30 PROCEDURE — 83690 ASSAY OF LIPASE: CPT | Performed by: STUDENT IN AN ORGANIZED HEALTH CARE EDUCATION/TRAINING PROGRAM

## 2024-04-30 PROCEDURE — 250N000009 HC RX 250: Performed by: OBSTETRICS & GYNECOLOGY

## 2024-04-30 PROCEDURE — 85610 PROTHROMBIN TIME: CPT | Performed by: STUDENT IN AN ORGANIZED HEALTH CARE EDUCATION/TRAINING PROGRAM

## 2024-04-30 RX ORDER — KETOROLAC TROMETHAMINE 15 MG/ML
15 INJECTION, SOLUTION INTRAMUSCULAR; INTRAVENOUS EVERY 6 HOURS
Status: DISCONTINUED | OUTPATIENT
Start: 2024-04-30 | End: 2024-05-01 | Stop reason: HOSPADM

## 2024-04-30 RX ORDER — NALOXONE HYDROCHLORIDE 0.4 MG/ML
0.1 INJECTION, SOLUTION INTRAMUSCULAR; INTRAVENOUS; SUBCUTANEOUS
Status: DISCONTINUED | OUTPATIENT
Start: 2024-04-30 | End: 2024-04-30 | Stop reason: HOSPADM

## 2024-04-30 RX ORDER — MORPHINE SULFATE 4 MG/ML
4 INJECTION, SOLUTION INTRAMUSCULAR; INTRAVENOUS ONCE
Status: COMPLETED | OUTPATIENT
Start: 2024-04-30 | End: 2024-04-30

## 2024-04-30 RX ORDER — NALOXONE HYDROCHLORIDE 0.4 MG/ML
0.2 INJECTION, SOLUTION INTRAMUSCULAR; INTRAVENOUS; SUBCUTANEOUS
Status: DISCONTINUED | OUTPATIENT
Start: 2024-04-30 | End: 2024-05-01 | Stop reason: HOSPADM

## 2024-04-30 RX ORDER — ONDANSETRON 2 MG/ML
INJECTION INTRAMUSCULAR; INTRAVENOUS PRN
Status: DISCONTINUED | OUTPATIENT
Start: 2024-04-30 | End: 2024-04-30

## 2024-04-30 RX ORDER — BUPIVACAINE HYDROCHLORIDE 2.5 MG/ML
INJECTION, SOLUTION INFILTRATION; PERINEURAL PRN
Status: DISCONTINUED | OUTPATIENT
Start: 2024-04-30 | End: 2024-04-30 | Stop reason: HOSPADM

## 2024-04-30 RX ORDER — SODIUM CHLORIDE, SODIUM LACTATE, POTASSIUM CHLORIDE, CALCIUM CHLORIDE 600; 310; 30; 20 MG/100ML; MG/100ML; MG/100ML; MG/100ML
INJECTION, SOLUTION INTRAVENOUS CONTINUOUS
Status: DISCONTINUED | OUTPATIENT
Start: 2024-04-30 | End: 2024-04-30 | Stop reason: HOSPADM

## 2024-04-30 RX ORDER — PROCHLORPERAZINE MALEATE 10 MG
10 TABLET ORAL EVERY 6 HOURS PRN
Status: DISCONTINUED | OUTPATIENT
Start: 2024-04-30 | End: 2024-05-01 | Stop reason: HOSPADM

## 2024-04-30 RX ORDER — LIDOCAINE 40 MG/G
CREAM TOPICAL
Status: DISCONTINUED | OUTPATIENT
Start: 2024-04-30 | End: 2024-04-30 | Stop reason: HOSPADM

## 2024-04-30 RX ORDER — FENTANYL CITRATE 50 UG/ML
50 INJECTION, SOLUTION INTRAMUSCULAR; INTRAVENOUS
Status: DISCONTINUED | OUTPATIENT
Start: 2024-04-30 | End: 2024-05-01 | Stop reason: HOSPADM

## 2024-04-30 RX ORDER — ONDANSETRON 2 MG/ML
4 INJECTION INTRAMUSCULAR; INTRAVENOUS EVERY 6 HOURS PRN
Status: DISCONTINUED | OUTPATIENT
Start: 2024-04-30 | End: 2024-05-01 | Stop reason: HOSPADM

## 2024-04-30 RX ORDER — AMOXICILLIN 250 MG
1 CAPSULE ORAL 2 TIMES DAILY
Status: DISCONTINUED | OUTPATIENT
Start: 2024-04-30 | End: 2024-05-01 | Stop reason: HOSPADM

## 2024-04-30 RX ORDER — GABAPENTIN 300 MG/1
300 CAPSULE ORAL DAILY
Status: DISCONTINUED | OUTPATIENT
Start: 2024-05-01 | End: 2024-05-01 | Stop reason: HOSPADM

## 2024-04-30 RX ORDER — METHOCARBAMOL 500 MG/1
500 TABLET, FILM COATED ORAL 3 TIMES DAILY PRN
Status: DISCONTINUED | OUTPATIENT
Start: 2024-04-30 | End: 2024-05-01 | Stop reason: HOSPADM

## 2024-04-30 RX ORDER — NALOXONE HYDROCHLORIDE 0.4 MG/ML
0.4 INJECTION, SOLUTION INTRAMUSCULAR; INTRAVENOUS; SUBCUTANEOUS
Status: DISCONTINUED | OUTPATIENT
Start: 2024-04-30 | End: 2024-05-01 | Stop reason: HOSPADM

## 2024-04-30 RX ORDER — MELOXICAM 7.5 MG/1
15 TABLET ORAL DAILY PRN
Status: DISCONTINUED | OUTPATIENT
Start: 2024-04-30 | End: 2024-04-30

## 2024-04-30 RX ORDER — LORAZEPAM 0.5 MG/1
0.5 TABLET ORAL
Status: COMPLETED | OUTPATIENT
Start: 2024-04-30 | End: 2024-04-30

## 2024-04-30 RX ORDER — FENTANYL CITRATE 50 UG/ML
INJECTION, SOLUTION INTRAMUSCULAR; INTRAVENOUS PRN
Status: DISCONTINUED | OUTPATIENT
Start: 2024-04-30 | End: 2024-04-30

## 2024-04-30 RX ORDER — FENTANYL CITRATE 50 UG/ML
25 INJECTION, SOLUTION INTRAMUSCULAR; INTRAVENOUS EVERY 5 MIN PRN
Status: DISCONTINUED | OUTPATIENT
Start: 2024-04-30 | End: 2024-04-30 | Stop reason: HOSPADM

## 2024-04-30 RX ORDER — ONDANSETRON 4 MG/1
4 TABLET, ORALLY DISINTEGRATING ORAL EVERY 6 HOURS PRN
Status: DISCONTINUED | OUTPATIENT
Start: 2024-04-30 | End: 2024-05-01 | Stop reason: HOSPADM

## 2024-04-30 RX ORDER — PROPOFOL 10 MG/ML
INJECTION, EMULSION INTRAVENOUS PRN
Status: DISCONTINUED | OUTPATIENT
Start: 2024-04-30 | End: 2024-04-30

## 2024-04-30 RX ORDER — ACETAMINOPHEN 325 MG/1
975 TABLET ORAL EVERY 6 HOURS
Status: DISCONTINUED | OUTPATIENT
Start: 2024-04-30 | End: 2024-05-01 | Stop reason: HOSPADM

## 2024-04-30 RX ORDER — IBUPROFEN 800 MG/1
800 TABLET, FILM COATED ORAL EVERY 6 HOURS PRN
Qty: 30 TABLET | Refills: 0 | Status: SHIPPED | OUTPATIENT
Start: 2024-04-30 | End: 2024-05-15

## 2024-04-30 RX ORDER — DEXAMETHASONE SODIUM PHOSPHATE 4 MG/ML
INJECTION, SOLUTION INTRA-ARTICULAR; INTRALESIONAL; INTRAMUSCULAR; INTRAVENOUS; SOFT TISSUE PRN
Status: DISCONTINUED | OUTPATIENT
Start: 2024-04-30 | End: 2024-04-30

## 2024-04-30 RX ORDER — ACETAMINOPHEN 325 MG/1
975 TABLET ORAL EVERY 6 HOURS PRN
Qty: 50 TABLET | Refills: 0 | Status: SHIPPED | OUTPATIENT
Start: 2024-04-30

## 2024-04-30 RX ORDER — ONDANSETRON 2 MG/ML
4 INJECTION INTRAMUSCULAR; INTRAVENOUS EVERY 30 MIN PRN
Status: DISCONTINUED | OUTPATIENT
Start: 2024-04-30 | End: 2024-04-30

## 2024-04-30 RX ORDER — OXYCODONE HYDROCHLORIDE 5 MG/1
5 TABLET ORAL EVERY 4 HOURS PRN
Status: DISCONTINUED | OUTPATIENT
Start: 2024-04-30 | End: 2024-05-01 | Stop reason: HOSPADM

## 2024-04-30 RX ORDER — METRONIDAZOLE 500 MG/100ML
500 INJECTION, SOLUTION INTRAVENOUS EVERY 12 HOURS
Status: DISCONTINUED | OUTPATIENT
Start: 2024-04-30 | End: 2024-05-01

## 2024-04-30 RX ORDER — LIDOCAINE 40 MG/G
CREAM TOPICAL
Status: DISCONTINUED | OUTPATIENT
Start: 2024-04-30 | End: 2024-05-01 | Stop reason: HOSPADM

## 2024-04-30 RX ORDER — ONDANSETRON 2 MG/ML
4 INJECTION INTRAMUSCULAR; INTRAVENOUS ONCE
Status: COMPLETED | OUTPATIENT
Start: 2024-04-30 | End: 2024-04-30

## 2024-04-30 RX ORDER — HYDROMORPHONE HYDROCHLORIDE 1 MG/ML
0.2 INJECTION, SOLUTION INTRAMUSCULAR; INTRAVENOUS; SUBCUTANEOUS
Status: DISCONTINUED | OUTPATIENT
Start: 2024-04-30 | End: 2024-05-01 | Stop reason: HOSPADM

## 2024-04-30 RX ORDER — SODIUM CHLORIDE, SODIUM LACTATE, POTASSIUM CHLORIDE, CALCIUM CHLORIDE 600; 310; 30; 20 MG/100ML; MG/100ML; MG/100ML; MG/100ML
INJECTION, SOLUTION INTRAVENOUS CONTINUOUS
Status: DISCONTINUED | OUTPATIENT
Start: 2024-04-30 | End: 2024-05-01 | Stop reason: HOSPADM

## 2024-04-30 RX ORDER — AMOXICILLIN 250 MG
1-2 CAPSULE ORAL 2 TIMES DAILY
Qty: 30 TABLET | Refills: 0 | Status: SHIPPED | OUTPATIENT
Start: 2024-04-30 | End: 2024-05-15

## 2024-04-30 RX ORDER — SODIUM CHLORIDE, SODIUM LACTATE, POTASSIUM CHLORIDE, CALCIUM CHLORIDE 600; 310; 30; 20 MG/100ML; MG/100ML; MG/100ML; MG/100ML
INJECTION, SOLUTION INTRAVENOUS CONTINUOUS PRN
Status: DISCONTINUED | OUTPATIENT
Start: 2024-04-30 | End: 2024-04-30

## 2024-04-30 RX ORDER — ONDANSETRON 2 MG/ML
4 INJECTION INTRAMUSCULAR; INTRAVENOUS EVERY 30 MIN PRN
Status: DISCONTINUED | OUTPATIENT
Start: 2024-04-30 | End: 2024-04-30 | Stop reason: HOSPADM

## 2024-04-30 RX ORDER — BISACODYL 10 MG
10 SUPPOSITORY, RECTAL RECTAL DAILY PRN
Status: DISCONTINUED | OUTPATIENT
Start: 2024-04-30 | End: 2024-05-01 | Stop reason: HOSPADM

## 2024-04-30 RX ORDER — CALCIUM CARBONATE 500 MG/1
500 TABLET, CHEWABLE ORAL 4 TIMES DAILY PRN
Status: DISCONTINUED | OUTPATIENT
Start: 2024-04-30 | End: 2024-05-01 | Stop reason: HOSPADM

## 2024-04-30 RX ORDER — ACETAMINOPHEN 325 MG/1
650 TABLET ORAL EVERY 6 HOURS
Status: DISCONTINUED | OUTPATIENT
Start: 2024-05-03 | End: 2024-05-01 | Stop reason: HOSPADM

## 2024-04-30 RX ORDER — HYDRALAZINE HYDROCHLORIDE 20 MG/ML
2.5-5 INJECTION INTRAMUSCULAR; INTRAVENOUS EVERY 10 MIN PRN
Status: DISCONTINUED | OUTPATIENT
Start: 2024-04-30 | End: 2024-04-30 | Stop reason: HOSPADM

## 2024-04-30 RX ORDER — POLYETHYLENE GLYCOL 3350 17 G/17G
17 POWDER, FOR SOLUTION ORAL DAILY PRN
Status: DISCONTINUED | OUTPATIENT
Start: 2024-05-01 | End: 2024-05-01 | Stop reason: HOSPADM

## 2024-04-30 RX ORDER — QUETIAPINE FUMARATE 100 MG/1
300 TABLET, FILM COATED ORAL AT BEDTIME
Status: DISCONTINUED | OUTPATIENT
Start: 2024-04-30 | End: 2024-04-30

## 2024-04-30 RX ORDER — ONDANSETRON 4 MG/1
4 TABLET, ORALLY DISINTEGRATING ORAL EVERY 30 MIN PRN
Status: DISCONTINUED | OUTPATIENT
Start: 2024-04-30 | End: 2024-04-30 | Stop reason: HOSPADM

## 2024-04-30 RX ORDER — ACETAMINOPHEN 325 MG/1
975 TABLET ORAL ONCE
Status: COMPLETED | OUTPATIENT
Start: 2024-04-30 | End: 2024-04-30

## 2024-04-30 RX ORDER — METOPROLOL TARTRATE 1 MG/ML
1-2 INJECTION, SOLUTION INTRAVENOUS EVERY 5 MIN PRN
Status: DISCONTINUED | OUTPATIENT
Start: 2024-04-30 | End: 2024-04-30 | Stop reason: HOSPADM

## 2024-04-30 RX ORDER — CEFTRIAXONE 2 G/1
2 INJECTION, POWDER, FOR SOLUTION INTRAMUSCULAR; INTRAVENOUS EVERY 24 HOURS
Status: DISCONTINUED | OUTPATIENT
Start: 2024-04-30 | End: 2024-05-01

## 2024-04-30 RX ORDER — HYDROMORPHONE HYDROCHLORIDE 1 MG/ML
0.4 INJECTION, SOLUTION INTRAMUSCULAR; INTRAVENOUS; SUBCUTANEOUS EVERY 5 MIN PRN
Status: DISCONTINUED | OUTPATIENT
Start: 2024-04-30 | End: 2024-04-30 | Stop reason: HOSPADM

## 2024-04-30 RX ORDER — PROPOFOL 10 MG/ML
INJECTION, EMULSION INTRAVENOUS CONTINUOUS PRN
Status: DISCONTINUED | OUTPATIENT
Start: 2024-04-30 | End: 2024-04-30

## 2024-04-30 RX ORDER — ONDANSETRON 4 MG/1
4-8 TABLET, ORALLY DISINTEGRATING ORAL EVERY 8 HOURS PRN
Qty: 4 TABLET | Refills: 0 | Status: SHIPPED | OUTPATIENT
Start: 2024-04-30 | End: 2024-05-15

## 2024-04-30 RX ORDER — IOPAMIDOL 755 MG/ML
100 INJECTION, SOLUTION INTRAVASCULAR ONCE
Status: COMPLETED | OUTPATIENT
Start: 2024-04-30 | End: 2024-04-30

## 2024-04-30 RX ORDER — FENTANYL CITRATE 50 UG/ML
50 INJECTION, SOLUTION INTRAMUSCULAR; INTRAVENOUS EVERY 5 MIN PRN
Status: DISCONTINUED | OUTPATIENT
Start: 2024-04-30 | End: 2024-04-30 | Stop reason: HOSPADM

## 2024-04-30 RX ORDER — KETAMINE HYDROCHLORIDE 10 MG/ML
INJECTION INTRAMUSCULAR; INTRAVENOUS PRN
Status: DISCONTINUED | OUTPATIENT
Start: 2024-04-30 | End: 2024-04-30

## 2024-04-30 RX ORDER — HYDROMORPHONE HYDROCHLORIDE 1 MG/ML
0.2 INJECTION, SOLUTION INTRAMUSCULAR; INTRAVENOUS; SUBCUTANEOUS EVERY 5 MIN PRN
Status: DISCONTINUED | OUTPATIENT
Start: 2024-04-30 | End: 2024-04-30 | Stop reason: HOSPADM

## 2024-04-30 RX ORDER — GABAPENTIN 300 MG/1
300 CAPSULE ORAL 3 TIMES DAILY
Status: DISCONTINUED | OUTPATIENT
Start: 2024-04-30 | End: 2024-04-30

## 2024-04-30 RX ADMIN — ONDANSETRON 4 MG: 2 INJECTION INTRAMUSCULAR; INTRAVENOUS at 18:31

## 2024-04-30 RX ADMIN — Medication 30 MG: at 16:04

## 2024-04-30 RX ADMIN — CEFTRIAXONE SODIUM 2 G: 2 INJECTION, POWDER, FOR SOLUTION INTRAMUSCULAR; INTRAVENOUS at 13:47

## 2024-04-30 RX ADMIN — LORAZEPAM 0.5 MG: 0.5 TABLET ORAL at 22:44

## 2024-04-30 RX ADMIN — HYDROMORPHONE HYDROCHLORIDE 1 MG: 1 INJECTION, SOLUTION INTRAMUSCULAR; INTRAVENOUS; SUBCUTANEOUS at 13:44

## 2024-04-30 RX ADMIN — FENTANYL CITRATE 50 MCG: 50 INJECTION INTRAMUSCULAR; INTRAVENOUS at 15:42

## 2024-04-30 RX ADMIN — SENNOSIDES AND DOCUSATE SODIUM 1 TABLET: 50; 8.6 TABLET ORAL at 22:30

## 2024-04-30 RX ADMIN — SODIUM CHLORIDE 34 ML: 9 INJECTION, SOLUTION INTRAVENOUS at 13:31

## 2024-04-30 RX ADMIN — SODIUM CHLORIDE, POTASSIUM CHLORIDE, SODIUM LACTATE AND CALCIUM CHLORIDE: 600; 310; 30; 20 INJECTION, SOLUTION INTRAVENOUS at 19:01

## 2024-04-30 RX ADMIN — FENTANYL CITRATE 50 MCG: 50 INJECTION INTRAMUSCULAR; INTRAVENOUS at 17:56

## 2024-04-30 RX ADMIN — OXYCODONE HYDROCHLORIDE 5 MG: 5 TABLET ORAL at 22:31

## 2024-04-30 RX ADMIN — HYDROMORPHONE HYDROCHLORIDE 0.2 MG: 1 INJECTION, SOLUTION INTRAMUSCULAR; INTRAVENOUS; SUBCUTANEOUS at 20:06

## 2024-04-30 RX ADMIN — PROCHLORPERAZINE EDISYLATE 10 MG: 5 INJECTION INTRAMUSCULAR; INTRAVENOUS at 22:20

## 2024-04-30 RX ADMIN — PROPOFOL 150 MG: 10 INJECTION, EMULSION INTRAVENOUS at 15:46

## 2024-04-30 RX ADMIN — MORPHINE SULFATE 4 MG: 4 INJECTION INTRAVENOUS at 12:59

## 2024-04-30 RX ADMIN — ACETAMINOPHEN 975 MG: 325 TABLET, FILM COATED ORAL at 15:20

## 2024-04-30 RX ADMIN — ONDANSETRON 4 MG: 2 INJECTION INTRAMUSCULAR; INTRAVENOUS at 12:58

## 2024-04-30 RX ADMIN — MIDAZOLAM 2 MG: 1 INJECTION INTRAMUSCULAR; INTRAVENOUS at 15:36

## 2024-04-30 RX ADMIN — SODIUM CHLORIDE, POTASSIUM CHLORIDE, SODIUM LACTATE AND CALCIUM CHLORIDE: 600; 310; 30; 20 INJECTION, SOLUTION INTRAVENOUS at 17:00

## 2024-04-30 RX ADMIN — QUETIAPINE FUMARATE 150 MG: 100 TABLET ORAL at 22:31

## 2024-04-30 RX ADMIN — FENTANYL CITRATE 50 MCG: 50 INJECTION INTRAMUSCULAR; INTRAVENOUS at 15:46

## 2024-04-30 RX ADMIN — SUCCINYLCHOLINE CHLORIDE 80 MG: 20 INJECTION, SOLUTION INTRAMUSCULAR; INTRAVENOUS; PARENTERAL at 15:46

## 2024-04-30 RX ADMIN — DEXAMETHASONE SODIUM PHOSPHATE 8 MG: 4 INJECTION, SOLUTION INTRA-ARTICULAR; INTRALESIONAL; INTRAMUSCULAR; INTRAVENOUS; SOFT TISSUE at 15:36

## 2024-04-30 RX ADMIN — IOPAMIDOL 67 ML: 755 INJECTION, SOLUTION INTRAVENOUS at 13:30

## 2024-04-30 RX ADMIN — SODIUM CHLORIDE, POTASSIUM CHLORIDE, SODIUM LACTATE AND CALCIUM CHLORIDE: 600; 310; 30; 20 INJECTION, SOLUTION INTRAVENOUS at 15:36

## 2024-04-30 RX ADMIN — Medication 50 MG: at 16:04

## 2024-04-30 RX ADMIN — PROPOFOL 30 MCG/KG/MIN: 10 INJECTION, EMULSION INTRAVENOUS at 15:54

## 2024-04-30 RX ADMIN — FENTANYL CITRATE 25 MCG: 50 INJECTION INTRAMUSCULAR; INTRAVENOUS at 17:45

## 2024-04-30 RX ADMIN — HYDROMORPHONE HYDROCHLORIDE 0.5 MG: 1 INJECTION, SOLUTION INTRAMUSCULAR; INTRAVENOUS; SUBCUTANEOUS at 16:17

## 2024-04-30 RX ADMIN — KETOROLAC TROMETHAMINE 15 MG: 15 INJECTION, SOLUTION INTRAMUSCULAR; INTRAVENOUS at 22:42

## 2024-04-30 RX ADMIN — ONDANSETRON 4 MG: 2 INJECTION INTRAMUSCULAR; INTRAVENOUS at 20:08

## 2024-04-30 RX ADMIN — METRONIDAZOLE 500 MG: 500 INJECTION, SOLUTION INTRAVENOUS at 12:27

## 2024-04-30 RX ADMIN — ONDANSETRON 4 MG: 2 INJECTION INTRAMUSCULAR; INTRAVENOUS at 15:36

## 2024-04-30 RX ADMIN — ACETAMINOPHEN 975 MG: 325 TABLET, FILM COATED ORAL at 22:50

## 2024-04-30 RX ADMIN — METRONIDAZOLE 500 MG: 500 INJECTION, SOLUTION INTRAVENOUS at 23:58

## 2024-04-30 RX ADMIN — SUGAMMADEX 200 MG: 100 INJECTION, SOLUTION INTRAVENOUS at 17:05

## 2024-04-30 ASSESSMENT — ACTIVITIES OF DAILY LIVING (ADL)
ADLS_ACUITY_SCORE: 35
ADLS_ACUITY_SCORE: 18
ADLS_ACUITY_SCORE: 35
ADLS_ACUITY_SCORE: 35
ADLS_ACUITY_SCORE: 18
ADLS_ACUITY_SCORE: 35

## 2024-04-30 ASSESSMENT — COLUMBIA-SUICIDE SEVERITY RATING SCALE - C-SSRS
6. HAVE YOU EVER DONE ANYTHING, STARTED TO DO ANYTHING, OR PREPARED TO DO ANYTHING TO END YOUR LIFE?: NO
2. HAVE YOU ACTUALLY HAD ANY THOUGHTS OF KILLING YOURSELF IN THE PAST MONTH?: NO
1. IN THE PAST MONTH, HAVE YOU WISHED YOU WERE DEAD OR WISHED YOU COULD GO TO SLEEP AND NOT WAKE UP?: NO

## 2024-04-30 NOTE — BRIEF OP NOTE
[facility]    Brief Operative Note    Pre-operative diagnosis: Hemorrhagic cyst of right ovary [N83.201]  Post-operative diagnosis Same as pre-operative diagnosis    Procedure: exploration Laparoscopy with evacuation of hemoperitoneum, cauterization of right ovarian cyst, Right - Abdomen    Surgeon: Surgeons and Role:     * Jesusita Mckeon MD - Primary     * Aileen Sevilla MD - Resident - Assisting  Anesthesia: General     Estimated Blood Loss: 1000 mL from 4/30/2024  3:39 PM to 4/30/2024  5:17 PM  IVF: 1000 ml crystalloid  UOP: 600 ml clear urine    Drains: None  Specimens: * No specimens in log *    Findings: Exam under anesthesia with small, mobile, anteverted uterus, no adnexal fullness appreciated. On laparoscopy no evidence of injury from entry, hemoperitoneum with organized clot, approximately 1000 ml. On pelvic inspection, small simple follicle with active bleeding from ovarian serosal defect. Serosa and cyst bed cauterized with good improvement of bleeding noted. Hemoperitoneum evacuated with suction and placement of organized clot into endocatch bag. Surgiflo placed in cyst bed. Excellent hemostasis noted. Patient stable to PACU.    Complications: None.  Implants: * No implants in log *      Aileen Sevilla MD  OB/GYN Resident PGY-4  5:27 PM April 30, 2024

## 2024-04-30 NOTE — PROGRESS NOTES
[id number] arrived to unit from PACU after exploration Laparoscopy with evacuation of hemoperitoneum, cauterization of right ovarian cyst. VS WNL. Wristband printed and applied for patient. Patients partner arrived to unit and is at bedside.

## 2024-04-30 NOTE — ANESTHESIA PROCEDURE NOTES
Airway       Patient location during procedure: OR       Procedure Start/Stop Times: 4/30/2024 3:48 PM  Staff -        CRNA: Delphine Vidales APRN CRNA       Performed By: CRNA  Consent for Airway        Urgency: elective  Indications and Patient Condition       Indications for airway management: charlie-procedural       Induction type:RSI       Mask difficulty assessment: 0 - not attempted    Final Airway Details       Final airway type: endotracheal airway       Successful airway: ETT - single  Endotracheal Airway Details        ETT size (mm): 7.0       Cuffed: yes       Successful intubation technique: direct laryngoscopy       DL Blade Type: Gaines 2       Grade View of Cords: 1       Adjucts: stylet       Position: Center       Measured from: lips       Secured at (cm): 22       Bite block used: None    Post intubation assessment        Placement verified by: capnometry, equal breath sounds and chest rise        Number of attempts at approach: 1       Number of other approaches attempted: 0       Secured with: tape       Ease of procedure: easy       Dentition: Intact and Unchanged    Medication(s) Administered   Medication Administration Time: 4/30/2024 3:48 PM

## 2024-04-30 NOTE — ED TRIAGE NOTES
Abdominal pain and cramps started last night after intercourse. Pale and nausea.     Triage Assessment (Adult)       Row Name 04/30/24 1146          Triage Assessment    Airway WDL WDL        Respiratory WDL    Respiratory WDL WDL        Skin Circulation/Temperature WDL    Skin Circulation/Temperature WDL WDL        Cardiac WDL    Cardiac WDL WDL        Peripheral/Neurovascular WDL    Peripheral Neurovascular WDL WDL        Cognitive/Neuro/Behavioral WDL    Cognitive/Neuro/Behavioral WDL WDL

## 2024-04-30 NOTE — ANESTHESIA PREPROCEDURE EVALUATION
Anesthesia Pre-Procedure Evaluation    Patient: Marck Fernandez   MRN: [id number] : 1995        Procedure : Procedure(s):  Laparoscopic cystectomy ovarian  Laparoscopic salpingo-oophorectomy  Laparotomy exploratory          Past Medical History:   Diagnosis Date    Abnormal Pap smear of cervix     ADHD (attention deficit hyperactivity disorder)     Anxiety     Depression 2023    Eating disorder     Fibromyalgia 2023    Irritable bowel syndrome     Trichotillomania       Past Surgical History:   Procedure Laterality Date    top surgery/ double masectomy Bilateral       Allergies   Allergen Reactions    Codeine Itching      Social History     Tobacco Use    Smoking status: Never     Passive exposure: Never    Smokeless tobacco: Never   Substance Use Topics    Alcohol use: Never      Wt Readings from Last 1 Encounters:   24 62.1 kg (137 lb)        Anesthesia Evaluation            ROS/MED HX  ENT/Pulmonary:  - neg pulmonary ROS     Neurologic: Comment: Fibromyalgia - neg neurologic ROS     Cardiovascular:  - neg cardiovascular ROS     METS/Exercise Tolerance:     Hematologic:  - neg hematologic  ROS     Musculoskeletal:  - neg musculoskeletal ROS     GI/Hepatic:     (+)       Inflammatory bowel disease,             Renal/Genitourinary:       Endo:  - neg endo ROS     Psychiatric/Substance Use: Comment: ADHD    (+)  depression and anxiety   Recreational drug usage: Cannabis.    Infectious Disease:  - neg infectious disease ROS     Malignancy:       Other: Comment: Hemorrhagic right ovarian cyst           Physical Exam    Airway  airway exam normal      Mallampati: I       Respiratory Devices and Support         Dental       (+) Minor Abnormalities - some fillings, tiny chips      Cardiovascular   cardiovascular exam normal          Pulmonary   pulmonary exam normal                OUTSIDE LABS:  CBC:   Lab Results   Component Value Date    WBC 14.5 (H) 2024    WBC 10.5 2023    HGB  "12.2 04/30/2024    HGB 12.4 (L) 04/30/2024    HCT 36 04/30/2024    HCT 36.8 04/30/2024     04/30/2024     11/20/2023     BMP:   Lab Results   Component Value Date     04/30/2024     04/30/2024    POTASSIUM 3.5 04/30/2024    POTASSIUM 3.5 04/30/2024    CHLORIDE 102 04/30/2024    CHLORIDE 102 11/20/2023    CO2 23 04/30/2024    CO2 25 11/20/2023    BUN 14.3 04/30/2024    BUN 8.3 11/20/2023    CR 0.82 04/30/2024    CR 0.86 11/20/2023     (H) 04/30/2024     (H) 04/30/2024     COAGS:   Lab Results   Component Value Date    PTT 24 04/30/2024    INR 1.12 04/30/2024    FIBR 205 04/30/2024     POC: No results found for: \"BGM\", \"HCG\", \"HCGS\"  HEPATIC:   Lab Results   Component Value Date    ALBUMIN 4.4 04/30/2024    PROTTOTAL 6.2 (L) 04/30/2024    ALT 9 04/30/2024    AST 15 04/30/2024    ALKPHOS 48 04/30/2024    BILITOTAL 0.4 04/30/2024     OTHER:   Lab Results   Component Value Date    LACT 2.7 (H) 04/30/2024    A1C 5.2 11/20/2023    TASIA 8.7 04/30/2024    LIPASE 16 04/30/2024    TSH 0.63 11/20/2023       Anesthesia Plan    ASA Status:  2, emergent    NPO Status:  ELEVATED Aspiration Risk/Unknown    Anesthesia Type: General.     - Airway: ETT   Induction: Intravenous, RSI, Propofol.   Maintenance: Balanced.        Consents    Anesthesia Plan(s) and associated risks, benefits, and realistic alternatives discussed. Questions answered and patient/representative(s) expressed understanding.     - Discussed: Risks, Benefits and Alternatives for the PROCEDURE were discussed     - Discussed with:  Patient            Postoperative Care    Pain management: IV analgesics, Oral pain medications, Multi-modal analgesia.   PONV prophylaxis: Ondansetron (or other 5HT-3), Dexamethasone or Solumedrol, Background Propofol Infusion     Comments:               Kendall Garcia MD    I have reviewed the pertinent notes and labs in the chart from the past 30 days and (re)examined the patient.  Any updates or " changes from those notes are reflected in this note.

## 2024-04-30 NOTE — CONSULTS
Gynecology Consult Note    Name: Marck Fernandez    MRN: [id number]   YOB: 1995               Chief Complaint:   Abdominal pain, c/f hemoperitoneum           History of Present Illness:   Marck Fernandez is a 28 year old non-binary person (they/them) who presented to evaluation of severe abdominal pain with associated nausea, vomiting, and lightheadedness today. Pain began last night after having penetrative intercourse, felt like deep pain. Worsened this morning, which is when nausea and vomiting started. They report pain is all over abdomen, comes in waves and sharp, goes all the way up to their sternum and have intermittent shoulder pain as well. Voiding spontaneously, normal bowel function. Has been feeling chills today, but no fevers.     GYN called urgently upon patient arrival to the ED. Initial VS with tachycardia, hypotension, and FAST exam positive for hemoperitoneum. MTP and trauma activation called by ED provider.     Gyn Hx:   - Menses: Regular, monthly, LMP 4/10  - Contraception: none, not sexually active with partners who have sperm  - H/o STIs: not discussed    OB Hx: G0           Past Medical History:     Past Medical History:   Diagnosis Date    Abnormal Pap smear of cervix     ADHD (attention deficit hyperactivity disorder)     Anxiety     Depression 11/20/2023    Eating disorder     Fibromyalgia 11/20/2023    Irritable bowel syndrome     Trichotillomania    Denies any h/o HTN or asthma.         Past Surgical History:     Past Surgical History:   Procedure Laterality Date    top surgery/ double masectomy Bilateral             Social History:     Social History     Tobacco Use    Smoking status: Never     Passive exposure: Never    Smokeless tobacco: Never   Substance Use Topics    Alcohol use: Never            Family History:     Family History   Problem Relation Age of Onset    Joint hypermobility Mother     Joint hypermobility Father     Spine Problems Father     Alzheimer Disease  Maternal Grandmother     Coronary Artery Disease Maternal Grandfather      No family h/o VTE.          Allergies:     Allergies   Allergen Reactions    Codeine Itching            Medications:     Current Facility-Administered Medications   Medication Dose Route Frequency Provider Last Rate Last Admin    cefTRIAXone (ROCEPHIN) 2 g vial to attach to  ml bag for ADULTS or NS 50 ml bag for PEDS  2 g Intravenous Q24H Yandel Love MD   Stopped at 04/30/24 1428    fentaNYL (PF) (SUBLIMAZE) injection 50 mcg  50 mcg Intravenous Q15 Min PRN Asa Ragsdale MD        lactated ringers BOLUS 1,863 mL  30 mL/kg Intravenous Once Yandel Love MD        lactated ringers infusion   Intravenous Continuous Asa Ragsdale MD        lidocaine (LMX4) cream   Topical Q1H PRN Asa Ragsdale MD        lidocaine 1 % 0.1-1 mL  0.1-1 mL Other Q1H PRN Asa Ragsdale MD        metroNIDAZOLE (FLAGYL) infusion 500 mg  500 mg Intravenous Q12H Yandel Love MD   Stopped at 04/30/24 1330    ondansetron (ZOFRAN) injection 4 mg  4 mg Intravenous Q30 Min PRN Yandel Love MD        sodium chloride (PF) 0.9% PF flush 3 mL  3 mL Intracatheter Q8H Asa Ragsdale MD        sodium chloride (PF) 0.9% PF flush 3 mL  3 mL Intracatheter q1 min prn Asa Ragsdale MD        sodium chloride 0.9 % bag 500mL for CT scan flush use  100 mL Intravenous Q1H PRN Yandel Love MD   34 mL at 04/30/24 1331     Current Outpatient Medications   Medication Sig Dispense Refill    gabapentin (NEURONTIN) 300 MG capsule Take 1 capsule (300 mg) by mouth 3 times daily 120 capsule 3    LORazepam (ATIVAN) 0.5 MG tablet TAKE 1 TABLET BY MOUTH ONCE A DAY AS NEEDED FOR INCREASED ANXIETY OR PANIC ATTACK      meloxicam (MOBIC) 15 MG tablet Take 1 tablet (15 mg) by mouth daily as needed for moderate pain or pain 30 tablet 3    methocarbamol (ROBAXIN) 500 MG tablet Take 500 mg by mouth      ondansetron (ZOFRAN ODT) 4 MG ODT tab Take 1 tablet (4 mg) by  mouth every 8 hours as needed for nausea 90 tablet 1    ondansetron (ZOFRAN ODT) 4 MG ODT tab Take 4 mg by mouth      QUEtiapine (SEROQUEL) 100 MG tablet TAKE 1 AND 1/2 TABLETS BY MOUTH DAILY AT BEDTIME      QUEtiapine (SEROQUEL) 200 MG tablet Take 1 tablet (200 mg) by mouth at bedtime Pt takes half of a pill 90 tablet 1             Review of Systems:    ROS: 10 point ROS negative other than those noted above in the HPI.       Physical Exam:     Vitals:    04/30/24 1304 04/30/24 1316 04/30/24 1343 04/30/24 1415   BP: 110/69 105/70 113/63 107/66   Pulse: 69 62  72   Resp: 18 18 16    Temp: 98.5  F (36.9  C) 98.2  F (36.8  C) 97.7  F (36.5  C)    TempSrc: Oral Oral Oral    SpO2: 100% 100% 100% 99%   Weight:       Height:         General: Pale appearing, NAD, in Trendelenburg  Resp: Breathing comfortably on room air   CV: Well perfused, slightly cool extremities  MSK: Moving all extremities  Abdomen: Soft, mildly distended, diffuse tenderness to palpation. No rebound or guarding  : Deferred  Ext: No edema  Neuro: Grossly normal   Skin: No rashes or ecchymoses noted.      Labs/Imaging     Results for orders placed or performed during the hospital encounter of 04/30/24 (from the past 24 hour(s))   POC US ABDOMEN LIMITED    Impression    Bedside FAST (Focused Assessment with Sonography in Trauma), performed and interpreted by me.   Indication: Hypotension    With the patient in Trendelenburg, the RUQ, LUQ and subxiphoid views were examined for intraabdominal and thoracic free fluid and pericardial effusion. With the patient in reverse Trendelenburg, the suprapubic view was examined for intraabdominal free fluid. Image quality was satisfactory..     Findings: Abnormal.  Free fluid, likely bloody, suprapubic   Extended FAST exam (eFAST):   Indication: Hypotension         IMPRESSION:  Positive FAST showing suprapubic free fluid   CBC with platelets differential    Narrative    The following orders were created for panel  "order CBC with platelets differential.  Procedure                               Abnormality         Status                     ---------                               -----------         ------                     CBC with platelets and d...[[id number]]  Abnormal            Final result                 Please view results for these tests on the individual orders.   INR   Result Value Ref Range    INR 1.12 0.85 - 1.15   Comprehensive metabolic panel   Result Value Ref Range    Sodium 138 135 - 145 mmol/L    Potassium 3.5 3.4 - 5.3 mmol/L    Carbon Dioxide (CO2) 23 22 - 29 mmol/L    Anion Gap 13 7 - 15 mmol/L    Urea Nitrogen 14.3 6.0 - 20.0 mg/dL    Creatinine 0.82 0.51 - 1.17 mg/dL    GFR Estimate >90 >60 mL/min/1.73m2    Calcium 8.7 8.6 - 10.0 mg/dL    Chloride 102 98 - 107 mmol/L    Glucose 161 (H) 70 - 99 mg/dL    Alkaline Phosphatase 48 40 - 150 U/L    AST 15 0 - 45 U/L    ALT 9 0 - 70 U/L    Protein Total 6.2 (L) 6.4 - 8.3 g/dL    Albumin 4.4 3.5 - 5.2 g/dL    Bilirubin Total 0.4 <=1.2 mg/dL    Narrative    The generation of reference intervals for this test is currently based on binary male or female sex. If the electronic health record information indicates another gender identity or if Legal Sex is recorded as \"Unknown\", both male and female reference intervals are provided where applicable, and should be considered according to the individual's appropriate clinical context.   Lipase   Result Value Ref Range    Lipase 16 13 - 60 U/L   Lactic acid whole blood   Result Value Ref Range    Lactic Acid 2.7 (H) 0.7 - 2.0 mmol/L   Procalcitonin   Result Value Ref Range    Procalcitonin 0.03 <0.50 ng/mL   CBC with platelets and differential   Result Value Ref Range    WBC Count 14.5 (H) 4.0 - 11.0 10e3/uL    RBC Count 4.10 3.80 - 5.90 10e6/uL    Hemoglobin 12.4 (L) 13.3 - 17.7 g/dL    Hematocrit 36.8 35.0 - 53.0 %    MCV 90 78 - 100 fL    MCH 30.2 26.5 - 33.0 pg    MCHC 33.7 31.5 - 36.5 g/dL    RDW 11.9 10.0 - 15.0 % " "   Platelet Count 235 150 - 450 10e3/uL    % Neutrophils 75 %    % Lymphocytes 18 %    % Monocytes 5 %    % Eosinophils 1 %    % Basophils 0 %    % Immature Granulocytes 1 %    NRBCs per 100 WBC 0 <1 /100    Absolute Neutrophils 10.9 (H) 1.6 - 8.3 10e3/uL    Absolute Lymphocytes 2.7 0.8 - 5.3 10e3/uL    Absolute Monocytes 0.7 0.0 - 1.3 10e3/uL    Absolute Eosinophils 0.1 0.0 - 0.7 10e3/uL    Absolute Basophils 0.1 0.0 - 0.2 10e3/uL    Absolute Immature Granulocytes 0.1 <=0.4 10e3/uL    Absolute NRBCs 0.0 10e3/uL    Narrative    The generation of reference intervals for this test is currently based on binary male or female sex. If the electronic health record information indicates another gender identity or if Legal Sex is recorded as \"Unknown\", both male and female reference intervals are provided where applicable, and should be considered according to the individual's appropriate clinical context.   ABO/Rh type and screen *Canceled*    Narrative    The following orders were created for panel order ABO/Rh type and screen.  Procedure                               Abnormality         Status                     ---------                               -----------         ------                     Adult Type and Screen[[id number]]                                                         Please view results for these tests on the individual orders.   Partial thromboplastin time   Result Value Ref Range    aPTT 24 22 - 38 Seconds   Fibrinogen activity   Result Value Ref Range    Fibrinogen Activity 205 170 - 490 mg/dL   ABO/Rh type and screen    Narrative    The following orders were created for panel order ABO/Rh type and screen.  Procedure                               Abnormality         Status                     ---------                               -----------         ------                     Adult Type and Screen[[id number]]                            Final result                 Please view results for these " "tests on the individual orders.   Adult Type and Screen   Result Value Ref Range    ABO/RH(D) A POS     Antibody Screen Negative Negative    SPECIMEN EXPIRATION DATE [id number]    ABO/Rh type and screen *Canceled*    Narrative    The following orders were created for panel order ABO/Rh type and screen.  Procedure                               Abnormality         Status                     ---------                               -----------         ------                       Please view results for these tests on the individual orders.   iStat Gases Electrolytes ICA Glucose Venous, POCT   Result Value Ref Range    CPB Applied No     Hematocrit POCT 36 35 - 53 %    Calcium, Ionized Whole Blood POCT 4.9 4.4 - 5.2 mg/dL    Glucose Whole Blood POCT 152 (H) 70 - 99 mg/dL    Bicarbonate Venous POCT 24 21 - 28 mmol/L    Hemoglobin POCT 12.2 11.7 - 17.7 g/dL    Potassium POCT 3.5 3.4 - 5.3 mmol/L    Sodium POCT 139 135 - 145 mmol/L    pCO2 Venous POCT 46 40 - 50 mm Hg    pO2 Venous POCT 26 25 - 47 mm Hg    pH Venous POCT 7.32 7.32 - 7.43    O2 Sat, Venous POCT 42 (L) 70 - 75 %    Base Excess/Deficit (+/-) POCT -3.0 -3.0 - 3.0 mmol/L    Narrative    The generation of reference intervals for this test is currently based on binary male or female sex. If the electronic health record information indicates another gender identity or if Legal Sex is recorded as \"Unknown\", both male and female reference intervals are provided where applicable, and should be considered according to the individual's appropriate clinical context.   Prepare red blood cells (unit)   Result Value Ref Range    ISSUE DATE AND TIME 20240430123000     Blood Component Type Red Blood Cells     Product Code V5612N95     Unit Status Issued     Unit Number [id number]     UNIT ABO/RH O-     CODING SYSTEM [id number]     UNIT TYPE ISBT 9500    Prepare red blood cells (unit)   Result Value Ref Range    ISSUE DATE AND TIME 20240430123000     Blood Component Type " Red Blood Cells     Product Code B3405Z83     Unit Status Issued     Unit Number [id number]     UNIT ABO/RH O-     CODING SYSTEM [id number]     UNIT TYPE ISBT 9500    Prepare red blood cells (unit)   Result Value Ref Range    ISSUE DATE AND TIME [id number]     Blood Component Type Red Blood Cells     Product Code B0309T22     Unit Status Transfused     Unit Number [id number]     UNIT ABO/RH O-     CODING SYSTEM [id number]     UNIT TYPE ISBT 9500    CT Abdomen Pelvis w Contrast   Result Value Ref Range    Radiologist flags Hemoperitoneum and active bleeding in the right (AA)     Narrative    CT ABDOMEN AND PELVIS WITH CONTRAST 4/30/2024 1:40 PM    CLINICAL HISTORY: Abdominal pain.    TECHNIQUE: CT scan of the abdomen and pelvis was performed following  injection of IV contrast. Multiplanar reformats were obtained. Dose  reduction techniques were used.    CONTRAST: 67 mL Isovue 370    COMPARISON: None.    FINDINGS:   LOWER CHEST: Small hiatal hernia.    HEPATOBILIARY: No hepatic masses. No calcified gallstones.    PANCREAS: Normal.    SPLEEN: Normal.    ADRENAL GLANDS: Normal.    KIDNEYS/BLADDER: Small amount of air within the urinary bladder may be  related to recent instrumentation. No hydronephrosis.    BOWEL: Moderate amount of stool in the rectum. No bowel obstruction.  No evidence for colitis or diverticulitis. Unremarkable appendix.    LYMPH NODES: No lymphadenopathy.    VASCULATURE: A blush of contrast in the right adnexal region likely  represents active bleeding.    PELVIC ORGANS: Unremarkable.    ADDITIONAL FINDINGS: Moderate amount of free fluid in the abdomen and  pelvis, with high density best seen in the pelvis and right lower  quadrant, consistent with hemoperitoneum.    MUSCULOSKELETAL: Unremarkable.      Impression    IMPRESSION:   Moderate hemoperitoneum, with active bleeding in the right adnexal  region, possibly from a ruptured ovarian cyst.    [Critical Result: Hemoperitoneum and active  bleeding in the right  adnexal region]    Finding was identified on 4/30/2024 1:52 PM.     Dr. Love was contacted by me on 4/30/2024 1:52 PM and verbalized  understanding of the critical result.             Impression and Plan:     Marck Fernandez is a 28 year old G0 non-binary person (they/them) who presented to the ED for acute abdominal pain with associated lightheadedness, nausea, vomiting; found to be tachycardic and hypotensive with positive FAST exam in ED. Stabilized and CT findings concerning for ruptured hemorrhagic cyst. Recommended to go to OR urgently for diagnostic laparoscopy with likely right cystectomy, possible salpingo-oophorectomy, possible laparotomy. Reviewed procedure details and risks including bleeding, infection, injury to other tissues/organs, need for additional procedure reviewed with patient. Questions answered. They are comfortable with plan.       - To OR urgently  - No perioperative abx needed if stays laparoscopic, will give 2g Ancef if laparotomy required  - S/p 2U pRBC in OR, will monitor VS and repeat CBC intra-op vs postop; additional blood products prn   - Anticipate admission overnight for observation      Team OB/GYN Consult pagers: [phone] from 6:30AM to 6:30PM, [phone] from 6PM to 6:30AM.    Patient seen and care plan discussed under supervision of Dr. Mckeon.      Aileen Sevilla MD  OB/GYN Resident PGY-4  3:11 PM April 30, 2024      Appreciate Dr. Sevilla's note above, patient also seen and examined by me. I agree with the note above. I personally counseled the patient and their partner regarding recommendation for surgery and surgical plan/risks/benefits.  Jesusita Mckeon MD

## 2024-04-30 NOTE — ED PROVIDER NOTES
"    [facility] EMERGENCY DEPARTMENT ([facility])    4/30/24      ED PROVIDER NOTE   ED 4   History     Chief Complaint   Patient presents with    Abdominal Pain     Abdominal pain and cramps started last night after intercourse. Pale and nausea.     The history is provided by the patient and medical records.     Marck Fernandez is a 28 year old adult (assigned female at birth, they/them pronouns) s/p bilateral mastectomy who presents to the Emergency Department with pelvic pain and cramping that started last night after vaginal intercourse along with nausea and vomiting today. Patient had receptive vaginal intercourse last night. This was of normal intensity, though of a different angle than usual.  Patient describes the angle as \"being like the G spot\" but the sensation was more intense and painful, like nothing they have ever experienced before.  Patient continued to have this low pelvic discomfort after intercourse had ended and worsened over the course of the night with more cramping.  Patient continued to have low pelvic pain and cramping, new nausea and vomiting that started today as well as pallor.  Patient is not normally this pale.  Patient denies any vaginal bleeding or discharge, and is certain that they are not pregnant.  Patient is on quetiapine, gabapentin meloxicam.  No history of genital gender affirming surgery.  Patient states that the only notable change is that they had a hepatitis B vaccine yesterday after being seen in primary care clinic to establish cares as a new patient.  Patient moved here from [place].    Past Medical History  Past Medical History:   Diagnosis Date    Abnormal Pap smear of cervix     ADHD (attention deficit hyperactivity disorder)     Anxiety     Depression 11/20/2023    Eating disorder     Fibromyalgia 11/20/2023    Irritable bowel syndrome     Trichotillomania      Past Surgical History:   Procedure Laterality Date    top surgery/ double masectomy Bilateral  " "    gabapentin (NEURONTIN) 300 MG capsule  LORazepam (ATIVAN) 0.5 MG tablet  meloxicam (MOBIC) 15 MG tablet  methocarbamol (ROBAXIN) 500 MG tablet  ondansetron (ZOFRAN ODT) 4 MG ODT tab  ondansetron (ZOFRAN ODT) 4 MG ODT tab  QUEtiapine (SEROQUEL) 100 MG tablet  QUEtiapine (SEROQUEL) 200 MG tablet      Allergies   Allergen Reactions    Codeine Itching     Family History  Family History   Problem Relation Age of Onset    Joint hypermobility Mother     Joint hypermobility Father     Spine Problems Father     Alzheimer Disease Maternal Grandmother     Coronary Artery Disease Maternal Grandfather      Social History   Social History     Tobacco Use    Smoking status: Never     Passive exposure: Never    Smokeless tobacco: Never   Vaping Use    Vaping status: Never Used   Substance Use Topics    Alcohol use: Never    Drug use: Yes     Types: Marijuana     Comment: for pain         A medically appropriate review of systems was performed with pertinent positives and negatives noted in the HPI, and all other systems negative.    Physical Exam   BP: (!) 89/58  Pulse: (!) 126  Temp: 97.9  F (36.6  C)  Resp: 20  Height: 165.1 cm (5' 5\")  Weight: 62.1 kg (137 lb)  SpO2: 99 %    Physical Exam  Constitutional:       General: Marck is not in acute distress.     Appearance: Normal appearance. Marck is not diaphoretic.   HENT:      Head: Atraumatic.      Mouth/Throat:      Mouth: Mucous membranes are moist.   Eyes:      General: No scleral icterus.     Conjunctiva/sclera: Conjunctivae normal.   Cardiovascular:      Rate and Rhythm: Normal rate.      Heart sounds: Normal heart sounds.   Pulmonary:      Effort: No respiratory distress.      Breath sounds: Normal breath sounds. No stridor. No wheezing or rhonchi.   Abdominal:      General: Abdomen is flat.      Comments: Abdomen diffusely tender to palpation with guarding   Musculoskeletal:      Cervical back: Neck supple.   Skin:     General: Skin is warm.      Capillary Refill: " Capillary refill takes 2 to 3 seconds.      Coloration: Skin is pale.      Findings: No rash.   Neurological:      Mental Status: Marck is alert.           ED Course, Procedures, & Data     ED Course as of 04/30/24 1420   Tue Apr 30, 2024   1210 Free fluid noted on fast exam   1210 Discussed plan for labs, type and screen, imaging, trauma and gyn consult, MTP   1218 Vital signs have improved on recheck.  Will hold off on giving MTP for now but will obtain 2 units and transfuse with plan to hold MTP just in case   1229 2 units of blood ready for patient, NST being sent to pick it up.   1244 OB is in room, they do no recommend giving 2 units of blood as patient's hemoglobin is 12.      Procedures  Results for orders placed during the hospital encounter of 04/30/24    POC US ABDOMEN LIMITED    Impression  Bedside FAST (Focused Assessment with Sonography in Trauma), performed and interpreted by me.  Indication: Hypotension    With the patient in Trendelenburg, the RUQ, LUQ and subxiphoid views were examined for intraabdominal and thoracic free fluid and pericardial effusion. With the patient in reverse Trendelenburg, the suprapubic view was examined for intraabdominal free fluid. Image quality was satisfactory..    Findings: Abnormal.  Free fluid, likely bloody, suprapubic  Extended FAST exam (eFAST):  Indication: Hypotension        IMPRESSION:  Positive FAST showing suprapubic free fluid               Results for orders placed or performed during the hospital encounter of 04/30/24   CT Abdomen Pelvis w Contrast     Status: Abnormal (Preliminary result)   Result Value Ref Range    Radiologist flags Hemoperitoneum and active bleeding in the right (AA)     Narrative    CT ABDOMEN AND PELVIS WITH CONTRAST 4/30/2024 1:40 PM    CLINICAL HISTORY: Abdominal pain.    TECHNIQUE: CT scan of the abdomen and pelvis was performed following  injection of IV contrast. Multiplanar reformats were obtained. Dose  reduction techniques were  used.    CONTRAST: 67 mL Isovue 370    COMPARISON: None.    FINDINGS:   LOWER CHEST: Small hiatal hernia.    HEPATOBILIARY: No hepatic masses. No calcified gallstones.    PANCREAS: Normal.    SPLEEN: Normal.    ADRENAL GLANDS: Normal.    KIDNEYS/BLADDER: Small amount of air within the urinary bladder may be  related to recent instrumentation. No hydronephrosis.    BOWEL: Moderate amount of stool in the rectum. No bowel obstruction.  No evidence for colitis or diverticulitis. Unremarkable appendix.    LYMPH NODES: No lymphadenopathy.    VASCULATURE: A blush of contrast in the right adnexal region likely  represents active bleeding.    PELVIC ORGANS: Unremarkable.    ADDITIONAL FINDINGS: Moderate amount of free fluid in the abdomen and  pelvis, with high density best seen in the pelvis and right lower  quadrant, consistent with hemoperitoneum.    MUSCULOSKELETAL: Unremarkable.      Impression    IMPRESSION:   Moderate hemoperitoneum, with active bleeding in the right adnexal  region, possibly from a ruptured ovarian cyst.    [Critical Result: Hemoperitoneum and active bleeding in the right  adnexal region]    Finding was identified on 4/30/2024 1:52 PM.     Dr. Love was contacted by me on 4/30/2024 1:52 PM and verbalized  understanding of the critical result.    POC US ABDOMEN LIMITED     Status: None    Impression    Bedside FAST (Focused Assessment with Sonography in Trauma), performed and interpreted by me.   Indication: Hypotension    With the patient in Trendelenburg, the RUQ, LUQ and subxiphoid views were examined for intraabdominal and thoracic free fluid and pericardial effusion. With the patient in reverse Trendelenburg, the suprapubic view was examined for intraabdominal free fluid. Image quality was satisfactory..     Findings: Abnormal.  Free fluid, likely bloody, suprapubic   Extended FAST exam (eFAST):   Indication: Hypotension         IMPRESSION:  Positive FAST showing suprapubic free fluid   INR      "Status: Normal   Result Value Ref Range    INR 1.12 0.85 - 1.15   Comprehensive metabolic panel     Status: Abnormal   Result Value Ref Range    Sodium 138 135 - 145 mmol/L    Potassium 3.5 3.4 - 5.3 mmol/L    Carbon Dioxide (CO2) 23 22 - 29 mmol/L    Anion Gap 13 7 - 15 mmol/L    Urea Nitrogen 14.3 6.0 - 20.0 mg/dL    Creatinine 0.82 0.51 - 1.17 mg/dL    GFR Estimate >90 >60 mL/min/1.73m2    Calcium 8.7 8.6 - 10.0 mg/dL    Chloride 102 98 - 107 mmol/L    Glucose 161 (H) 70 - 99 mg/dL    Alkaline Phosphatase 48 40 - 150 U/L    AST 15 0 - 45 U/L    ALT 9 0 - 70 U/L    Protein Total 6.2 (L) 6.4 - 8.3 g/dL    Albumin 4.4 3.5 - 5.2 g/dL    Bilirubin Total 0.4 <=1.2 mg/dL    Narrative    The generation of reference intervals for this test is currently based on binary male or female sex. If the electronic health record information indicates another gender identity or if Legal Sex is recorded as \"Unknown\", both male and female reference intervals are provided where applicable, and should be considered according to the individual's appropriate clinical context.   Lipase     Status: Normal   Result Value Ref Range    Lipase 16 13 - 60 U/L   Lactic acid whole blood     Status: Abnormal   Result Value Ref Range    Lactic Acid 2.7 (H) 0.7 - 2.0 mmol/L   Procalcitonin     Status: Normal   Result Value Ref Range    Procalcitonin 0.03 <0.50 ng/mL   CBC with platelets and differential     Status: Abnormal   Result Value Ref Range    WBC Count 14.5 (H) 4.0 - 11.0 10e3/uL    RBC Count 4.10 3.80 - 5.90 10e6/uL    Hemoglobin 12.4 (L) 13.3 - 17.7 g/dL    Hematocrit 36.8 35.0 - 53.0 %    MCV 90 78 - 100 fL    MCH 30.2 26.5 - 33.0 pg    MCHC 33.7 31.5 - 36.5 g/dL    RDW 11.9 10.0 - 15.0 %    Platelet Count 235 150 - 450 10e3/uL    % Neutrophils 75 %    % Lymphocytes 18 %    % Monocytes 5 %    % Eosinophils 1 %    % Basophils 0 %    % Immature Granulocytes 1 %    NRBCs per 100 WBC 0 <1 /100    Absolute Neutrophils 10.9 (H) 1.6 - 8.3 " "10e3/uL    Absolute Lymphocytes 2.7 0.8 - 5.3 10e3/uL    Absolute Monocytes 0.7 0.0 - 1.3 10e3/uL    Absolute Eosinophils 0.1 0.0 - 0.7 10e3/uL    Absolute Basophils 0.1 0.0 - 0.2 10e3/uL    Absolute Immature Granulocytes 0.1 <=0.4 10e3/uL    Absolute NRBCs 0.0 10e3/uL    Narrative    The generation of reference intervals for this test is currently based on binary male or female sex. If the electronic health record information indicates another gender identity or if Legal Sex is recorded as \"Unknown\", both male and female reference intervals are provided where applicable, and should be considered according to the individual's appropriate clinical context.   iStat Gases Electrolytes ICA Glucose Venous, POCT     Status: Abnormal   Result Value Ref Range    CPB Applied No     Hematocrit POCT 36 35 - 53 %    Calcium, Ionized Whole Blood POCT 4.9 4.4 - 5.2 mg/dL    Glucose Whole Blood POCT 152 (H) 70 - 99 mg/dL    Bicarbonate Venous POCT 24 21 - 28 mmol/L    Hemoglobin POCT 12.2 11.7 - 17.7 g/dL    Potassium POCT 3.5 3.4 - 5.3 mmol/L    Sodium POCT 139 135 - 145 mmol/L    pCO2 Venous POCT 46 40 - 50 mm Hg    pO2 Venous POCT 26 25 - 47 mm Hg    pH Venous POCT 7.32 7.32 - 7.43    O2 Sat, Venous POCT 42 (L) 70 - 75 %    Base Excess/Deficit (+/-) POCT -3.0 -3.0 - 3.0 mmol/L    Narrative    The generation of reference intervals for this test is currently based on binary male or female sex. If the electronic health record information indicates another gender identity or if Legal Sex is recorded as \"Unknown\", both male and female reference intervals are provided where applicable, and should be considered according to the individual's appropriate clinical context.   Partial thromboplastin time     Status: Normal   Result Value Ref Range    aPTT 24 22 - 38 Seconds   Fibrinogen activity     Status: Normal   Result Value Ref Range    Fibrinogen Activity 205 170 - 490 mg/dL   Prepare red blood cells (unit)     Status: None " (Preliminary result)   Result Value Ref Range    ISSUE DATE AND TIME 20240430123000     Blood Component Type Red Blood Cells     Product Code O3893L12     Unit Status Issued     Unit Number [id number]     UNIT ABO/RH O-     CODING SYSTEM NJXN135     UNIT TYPE ISBT 9500    Prepare red blood cells (unit)     Status: None (Preliminary result)   Result Value Ref Range    ISSUE DATE AND TIME 20240430123000     Blood Component Type Red Blood Cells     Product Code T4726X60     Unit Status Issued     Unit Number [id number]     UNIT ABO/RH O-     CODING SYSTEM [id number]     UNIT TYPE ISBT 9500    Adult Type and Screen     Status: None   Result Value Ref Range    ABO/RH(D) A POS     Antibody Screen Negative Negative    SPECIMEN EXPIRATION DATE 20240503235900    Prepare red blood cells (unit)     Status: None   Result Value Ref Range    ISSUE DATE AND TIME 20240430123000     Blood Component Type Red Blood Cells     Product Code A9234H89     Unit Status Transfused     Unit Number [id number]     UNIT ABO/RH O-     CODING SYSTEM [id number]     UNIT TYPE ISBT 9500    CBC with platelets differential     Status: Abnormal    Narrative    The following orders were created for panel order CBC with platelets differential.  Procedure                               Abnormality         Status                     ---------                               -----------         ------                     CBC with platelets and d...[[id number]]  Abnormal            Final result                 Please view results for these tests on the individual orders.   ABO/Rh type and screen *Canceled*     Status: None ()    Narrative    The following orders were created for panel order ABO/Rh type and screen.  Procedure                               Abnormality         Status                     ---------                               -----------         ------                       Please view results for these tests on the individual orders.   ABO/Rh  type and screen *Canceled*     Status: None ()    Narrative    The following orders were created for panel order ABO/Rh type and screen.  Procedure                               Abnormality         Status                     ---------                               -----------         ------                     Adult Type and Screen[[id number]]                                                         Please view results for these tests on the individual orders.   ABO/Rh type and screen     Status: None    Narrative    The following orders were created for panel order ABO/Rh type and screen.  Procedure                               Abnormality         Status                     ---------                               -----------         ------                     Adult Type and Screen[[id number]]                            Final result                 Please view results for these tests on the individual orders.     Medications   lactated ringers BOLUS 1,863 mL (has no administration in time range)   ondansetron (ZOFRAN) injection 4 mg (has no administration in time range)   cefTRIAXone (ROCEPHIN) 2 g vial to attach to  ml bag for ADULTS or NS 50 ml bag for PEDS (2 g Intravenous $New Bag 4/30/24 1347)   metroNIDAZOLE (FLAGYL) infusion 500 mg (500 mg Intravenous $New Bag 4/30/24 1227)   sodium chloride 0.9 % bag 500mL for CT scan flush use (34 mLs Intravenous $Given 4/30/24 1331)   morphine (PF) injection 4 mg (4 mg Intravenous $Given 4/30/24 1259)   ondansetron (ZOFRAN) injection 4 mg (4 mg Intravenous $Given 4/30/24 1258)   iopamidol (ISOVUE-370) solution 100 mL (67 mLs Intravenous $Given 4/30/24 1330)   HYDROmorphone (DILAUDID) injection 1 mg (1 mg Intravenous $Given 4/30/24 1344)     Labs Ordered and Resulted from Time of ED Arrival to Time of ED Departure   COMPREHENSIVE METABOLIC PANEL - Abnormal       Result Value    Sodium 138      Potassium 3.5      Carbon Dioxide (CO2) 23      Anion Gap 13      Urea  Nitrogen 14.3      Creatinine 0.82      GFR Estimate >90      Calcium 8.7      Chloride 102      Glucose 161 (*)     Alkaline Phosphatase 48      AST 15      ALT 9      Protein Total 6.2 (*)     Albumin 4.4      Bilirubin Total 0.4     LACTIC ACID WHOLE BLOOD - Abnormal    Lactic Acid 2.7 (*)    CBC WITH PLATELETS AND DIFFERENTIAL - Abnormal    WBC Count 14.5 (*)     RBC Count 4.10      Hemoglobin 12.4 (*)     Hematocrit 36.8      MCV 90      MCH 30.2      MCHC 33.7      RDW 11.9      Platelet Count 235      % Neutrophils 75      % Lymphocytes 18      % Monocytes 5      % Eosinophils 1      % Basophils 0      % Immature Granulocytes 1      NRBCs per 100 WBC 0      Absolute Neutrophils 10.9 (*)     Absolute Lymphocytes 2.7      Absolute Monocytes 0.7      Absolute Eosinophils 0.1      Absolute Basophils 0.1      Absolute Immature Granulocytes 0.1      Absolute NRBCs 0.0     ISTAT GASES ELECTROLYTES ICA GLUCOSE VENOUS POCT - Abnormal    CPB Applied No      Hematocrit POCT 36      Calcium, Ionized Whole Blood POCT 4.9      Glucose Whole Blood POCT 152 (*)     Bicarbonate Venous POCT 24      Hemoglobin POCT 12.2      Potassium POCT 3.5      Sodium POCT 139      pCO2 Venous POCT 46      pO2 Venous POCT 26      pH Venous POCT 7.32      O2 Sat, Venous POCT 42 (*)     Base Excess/Deficit (+/-) POCT -3.0     INR - Normal    INR 1.12     LIPASE - Normal    Lipase 16     PROCALCITONIN - Normal    Procalcitonin 0.03     PARTIAL THROMBOPLASTIN TIME - Normal    aPTT 24     FIBRINOGEN ACTIVITY - Normal    Fibrinogen Activity 205     ROUTINE UA WITH MICROSCOPIC REFLEX TO CULTURE   LACTIC ACID WHOLE BLOOD   PREPARE RED BLOOD CELLS (UNIT)    ISSUE DATE AND TIME [id number]      Blood Component Type Red Blood Cells      Product Code A5251E03      Unit Status Issued      Unit Number [id number]      UNIT ABO/RH O-      CODING SYSTEM YGGT089      UNIT TYPE ISBT 9500     PREPARE RED BLOOD CELLS (UNIT)    ISSUE DATE AND TIME  [id number]      Blood Component Type Red Blood Cells      Product Code N9023K49      Unit Status Issued      Unit Number [id number]      UNIT ABO/RH O-      CODING SYSTEM [id number]      UNIT TYPE ISBT 9500     TYPE AND SCREEN, ADULT    ABO/RH(D) A POS      Antibody Screen Negative      SPECIMEN EXPIRATION DATE [id number]     PREPARE RED BLOOD CELLS (UNIT)    ISSUE DATE AND TIME 20240430123000      Blood Component Type Red Blood Cells      Product Code A4902D10      Unit Status Transfused      Unit Number [id number]      UNIT ABO/RH O-      CODING SYSTEM [id number]      UNIT TYPE ISBT 9500     PREPARE RED BLOOD CELLS (UNIT)   BLOOD CULTURE   BLOOD CULTURE   TRANSFUSE RED BLOOD CELLS (UNIT)   ABO/RH TYPE AND SCREEN     CT Abdomen Pelvis w Contrast   Preliminary Result   Abnormal   IMPRESSION:    Moderate hemoperitoneum, with active bleeding in the right adnexal   region, possibly from a ruptured ovarian cyst.      [Critical Result: Hemoperitoneum and active bleeding in the right   adnexal region]      Finding was identified on 4/30/2024 1:52 PM.       Dr. Love was contacted by me on 4/30/2024 1:52 PM and verbalized   understanding of the critical result.       POC US ABDOMEN LIMITED   Final Result   Bedside FAST (Focused Assessment with Sonography in Trauma), performed and interpreted by me.    Indication: Hypotension      With the patient in Trendelenburg, the RUQ, LUQ and subxiphoid views were examined for intraabdominal and thoracic free fluid and pericardial effusion. With the patient in reverse Trendelenburg, the suprapubic view was examined for intraabdominal free fluid. Image quality was satisfactory..       Findings: Abnormal.  Free fluid, likely bloody, suprapubic    Extended FAST exam (eFAST):    Indication: Hypotension             IMPRESSION:  Positive FAST showing suprapubic free fluid             Critical care was performed.   Critical Care Addendum  My initial assessment, based on my review  of prehospital provider report, review of nursing observations, review of vital signs, focused history, physical exam, discussion with OB/GYN, and interpretation of labs, CT scan , established a high suspicion that Marck Fernandez has severe hemorrhage and severe hypotension, which requires immediate intervention, and therefore Marck is critically ill.     After the initial assessment, the care team initiated multiple lab tests, initiated IV fluid administration, and initiated medication therapy with blood, fluids, antibiotics  to provide stabilization care. Due to the critical nature of this patient, I reassessed nursing observations, vital signs, and physical exam multiple times prior to Markc's disposition.     Time also spent performing documentation, discussion with family to obtain medical information for decision making, reviewing test results, discussion with consultants, and coordination of care.     Critical care time (excluding teaching time and procedures): 30 minutes.     Assessment & Plan    Patient arrived to the emergency room tachycardic, hypotensive, pale, and bedside ultrasound showed free fluid in her abdomen so both gynecology and trauma activation paged same time as well as massive transfusion bowel protocol ordered  Labs are reassuring with a leukocytosis and elevated lactate, hemoglobin reassuring, and patient's vital signs stabilized in the emergency room, so MTP canceled and 2 units of emergent blood ordered  CT scan shows likely active bleed and hemorrhagic cyst, so we will be able to take patient to the operating room    I have reviewed the nursing notes. I have reviewed the findings, diagnosis, plan and need for follow up with the patient.    New Prescriptions    No medications on file       Final diagnoses:   Ruptured ovarian cyst   IGloria, am serving as a trained medical scribe to document services personally performed by Yandel Love MD based on the provider's statements to  me on April 30, 2024.  This document has been checked and approved by the attending provider.    I, Yandel Love MD, was physically present and have reviewed and verified the accuracy of this note documented by Gloria Carlton, medical scribe.      Yandel Love MD     [facility] EMERGENCY DEPARTMENT  4/30/2024     Yandel Love MD  04/30/24 1425

## 2024-04-30 NOTE — TELEPHONE ENCOUNTER
Triage call  Patient calling to report they are having abdominal pain it started during intercourse and it  was crampy feeling it continued during the night and they stated it was even in their dreams.  They did have a feeling that they were going to vomit and they became flushed with sweat going down her face.  They rate the pain a 7-8/10 it goes from below their  breast all the way into their lower abdomin.  The pain is constant.    Per protocol  go to the ED now.  Care advice given.  Verbalizes understanding and agrees with plan.    Darshana Cat RN   [facility] Nurse Advisor  10:56 AM 4/30/2024    Reason for Disposition   SEVERE abdominal pain (e.g., excruciating)    Additional Information   Negative: Passed out (i.e., fainted, collapsed and was not responding)   Negative: Shock suspected (e.g., cold/pale/clammy skin, too weak to stand, low BP, rapid pulse)   Negative: Sounds like a life-threatening emergency to the triager   Negative: Followed an abdomen (stomach) injury   Negative: Chest pain   Negative: Abdominal pain and pregnant < 20 weeks   Negative: Abdominal pain and pregnant 20 or more weeks   Negative: Pain is mainly in upper abdomen (if needed ask: 'is it mainly above the belly button?')   Negative: Abdomen bloating or swelling are main symptoms    Protocols used: Abdominal Pain - Female-A-OH

## 2024-04-30 NOTE — ANESTHESIA CARE TRANSFER NOTE
Patient: Marck Fernandez    Procedure: Procedure(s):  exploration Laparoscopy with evacuation of hemoperitoneum, cauterization of right ovarian cyst       Diagnosis: Hemorrhagic cyst of right ovary [N83.201]  Diagnosis Additional Information: No value filed.    Anesthesia Type:   General     Note:    Oropharynx: oral airway in place  Level of Consciousness: drowsy  Oxygen Supplementation: face mask  Level of Supplemental Oxygen (L/min / FiO2): 6  Independent Airway: airway patency satisfactory and stable  Dentition: dentition unchanged  Vital Signs Stable: post-procedure vital signs reviewed and stable  Report to RN Given: handoff report given  Patient transferred to: PACU    Handoff Report: Identifed the Patient, Identified the Reponsible Provider, Reviewed the pertinent medical history, Discussed the surgical course, Reviewed Intra-OP anesthesia mangement and issues during anesthesia, Set expectations for post-procedure period and Allowed opportunity for questions and acknowledgement of understanding      Vitals:  Vitals Value Taken Time   /80 04/30/24 1722   Temp     Pulse 106 04/30/24 1725   Resp 25 04/30/24 1725   SpO2 100 % 04/30/24 1725   Vitals shown include unfiled device data.    Electronically Signed By: SHARI Marvin CRNA  April 30, 2024  5:26 PM

## 2024-04-30 NOTE — ED TRIAGE NOTES
Triage Assessment (Adult)       Row Name 04/30/24 1146          Triage Assessment    Airway WDL WDL        Respiratory WDL    Respiratory WDL WDL        Skin Circulation/Temperature WDL    Skin Circulation/Temperature WDL WDL        Cardiac WDL    Cardiac WDL WDL        Peripheral/Neurovascular WDL    Peripheral Neurovascular WDL WDL        Cognitive/Neuro/Behavioral WDL    Cognitive/Neuro/Behavioral WDL WDL

## 2024-04-30 NOTE — LETTER
[facility] [address]  [address]  [facility] [address]  Phone: [phone]  Fax: [phone]    May 1, 2024        Marck Fernandez  [address]   [facility] [address]          To whom it may concern:    RE: Marck Fernandez    Was admitted to the hospital from 4/30 to 5/1 for an acute medical condition. Please excuse them from work for the next two weeks. They may return to work on 5/20/2024 without restrictions.    Please contact me for questions or concerns.      Sincerely,      Aileen Sevilla MD

## 2024-04-30 NOTE — ANESTHESIA POSTPROCEDURE EVALUATION
Patient: Marck Fernandez    Procedure: Procedure(s):  exploration Laparoscopy with evacuation of hemoperitoneum, cauterization of right ovarian cyst       Anesthesia Type:  General    Note:  Disposition: Outpatient   Postop Pain Control: Uneventful            Sign Out: Well controlled pain   PONV: No   Neuro/Psych: Uneventful            Sign Out: Acceptable/Baseline neuro status   Airway/Respiratory: Uneventful            Sign Out: Acceptable/Baseline resp. status   CV/Hemodynamics: Uneventful            Sign Out: Acceptable CV status; No obvious hypovolemia; No obvious fluid overload   Other NRE: NONE   DID A NON-ROUTINE EVENT OCCUR?            Last vitals:  Vitals Value Taken Time   /74 04/30/24 1800   Temp 36.7  C (98.1  F) 04/30/24 1718   Pulse 86 04/30/24 1809   Resp 22 04/30/24 1809   SpO2 97 % 04/30/24 1809   Vitals shown include unfiled device data.    Electronically Signed By: Kendall Garcia MD  April 30, 2024  6:10 PM

## 2024-04-30 NOTE — DISCHARGE SUMMARY
[facility]   Gynecology Discharge Summary      Patient: Marck Fernandez    YOB: 1995   MRN# [id number]           Date of Admission:  4/30/2024  Date of Discharge::  5/1/2024  Admitting Physician:  Jesusita Mckeon MD  Discharge Physician:  Ariadne Pedraza MD             Admission Diagnoses:   - Hemoperitoneum  - C/f right ovarian cyst rupture          Discharge Diagnosis:   - Same s/p procedure below  - Acute blood loss anemia secondary to ruptured ovarian cyst            Procedures Performed:      Procedure(s):  Diagnostic Laparoscopy with evacuation of hemoperitoneum, cauterization of right ovarian cyst           Admission History   Marck Fernandez is a 28 year old non-binary person (they/them) who presented to evaluation of severe abdominal pain with associated nausea, vomiting, and lightheadedness today. Pain began last night after having penetrative intercourse, felt like deep pain. Worsened this morning, which is when nausea and vomiting started. They report pain is all over abdomen, comes in waves and sharp, goes all the way up to their sternum and have intermittent shoulder pain as well. Voiding spontaneously, normal bowel function. Has been feeling chills today, but no fevers.      GYN called urgently upon patient arrival to the ED. Initial VS with tachycardia, hypotension, and FAST exam positive for hemoperitoneum. MTP and trauma activation called by ED provider.       Surgical management with exploratory laparoscopy with likely right cystectomy, possible salpingo-oophorectomy, possible laparotomy  was recommended. The risks, benefits, and alternatives of surgery were discussed, and they agreed to proceed.              Operative Course   They underwent a diagnostic laparoscopy, which was uncomplicated. EBL was 1000 cc. See operative report for further details.    Findings: Exam under anesthesia with small, mobile, anteverted uterus, no adnexal  fullness appreciated. On laparoscopy no evidence of injury from entry, hemoperitoneum with organized clot, approximately 1000 ml. On pelvic inspection, small simple follicle with active bleeding from ovarian serosal defect. Serosa and cyst bed cauterized with good improvement of bleeding noted. Hemoperitoneum evacuated with suction and placement of organized clot into endocatch bag. Surgiflo placed in cyst bed. Excellent hemostasis noted. Patient stable to PACU.           Hospital Course   Their postoperative course was uncomplicated. They were initially maintained on IV fluids with IV pain medications and chin catheter in place. On POD#1, diet was advanced, chin catheter was removed, and they were transitioned to PO pain medications. On POD#1 they were tolerating regular diet, ambulating without difficulty, voiding spontaneously, and controlling pain with PO medications, and they were deemed stable for discharge.     Hemoglobin at the time of discharge was:    Hemoglobin   Date Value Ref Range Status   05/01/2024 9.6 (L) 13.3 - 17.7 g/dL Final     Comment:     Sex Specific Reference Ranges:    Female  11.7-15.7  g/dL  Male    13.3-17.7   g/dL              Discharge Instructions and Follow-Up:     Discharge activity: - No lifting >15 lbs or strenuous exercise for 6 weeks  - No driving while taking narcotics or until you can slam on the breaks without pain   Discharge follow-up: Follow up with Dr. Mckeon for a postoperative visit   Return Precautions: Patient was instructed to call  or return to ED for any of the following:    - Temperature greater than 100.4F   - Pain not controlled by pain medications   - Uncontrolled nausea/vomiting   - Foul-smelling vaginal discharge   - Vaginal bleeding soaking 1 pad per hour for 2 hours in a row   Discharge Medications:    Review of your medicines         Important    This medication list is not yet final, because your doctor or pharmacist is still double-checking some of the  changes. Ask your nurse for an updated version.  Specifically ask about this and similar medications: oxyCODONE (ROXICODONE) 5 MG tablet       START taking        Dose / Directions   acetaminophen 325 MG tablet  Commonly known as: TYLENOL      Dose: 975 mg  Take 3 tablets (975 mg) by mouth every 6 hours as needed for mild pain  Quantity: 50 tablet  Refills: 0     ibuprofen 800 MG tablet  Commonly known as: ADVIL/MOTRIN      Dose: 800 mg  Take 1 tablet (800 mg) by mouth every 6 hours as needed for other (mild and/or inflammatory pain)  Quantity: 30 tablet  Refills: 0     oxyCODONE 5 MG tablet  Commonly known as: ROXICODONE      Dose: 5-10 mg  Take 1-2 tablets (5-10 mg) by mouth every 4 hours as needed for moderate to severe pain  Quantity: 6 tablet  Refills: 0     senna-docusate 8.6-50 MG tablet  Commonly known as: SENOKOT-S/PERICOLACE      Dose: 1-2 tablet  Take 1-2 tablets by mouth 2 times daily  Quantity: 30 tablet  Refills: 0            CONTINUE these medicines which may have CHANGED, or have new prescriptions. If we are uncertain of the size of tablets/capsules you have at home, strength may be listed as something that might have changed.        Dose / Directions   * ondansetron 4 MG ODT tab  Commonly known as: ZOFRAN ODT  This may have changed: Another medication with the same name was added. Make sure you understand how and when to take each.  Used for: Nausea      Dose: 4 mg  Take 1 tablet (4 mg) by mouth every 8 hours as needed for nausea  Quantity: 90 tablet  Refills: 1     * ondansetron 4 MG ODT tab  Commonly known as: ZOFRAN ODT  This may have changed: You were already taking a medication with the same name, and this prescription was added. Make sure you understand how and when to take each.      Dose: 4-8 mg  Take 1-2 tablets (4-8 mg) by mouth every 8 hours as needed for nausea  Quantity: 4 tablet  Refills: 0           * This list has 2 medication(s) that are the same as other medications prescribed for  you. Read the directions carefully, and ask your doctor or other care provider to review them with you.                CONTINUE these medicines which have NOT CHANGED        Dose / Directions   gabapentin 300 MG capsule  Commonly known as: NEURONTIN  Used for: Visit for preventive health examination, Fibromyalgia      Dose: 300 mg  Take 1 capsule (300 mg) by mouth 3 times daily  Quantity: 120 capsule  Refills: 3     LORazepam 0.5 MG tablet  Commonly known as: ATIVAN      TAKE 1 TABLET BY MOUTH ONCE A DAY AS NEEDED FOR INCREASED ANXIETY OR PANIC ATTACK  Refills: 0     meloxicam 15 MG tablet  Commonly known as: MOBIC  Used for: Chronic pain of left ankle      Dose: 15 mg  Take 1 tablet (15 mg) by mouth daily as needed for moderate pain or pain  Quantity: 30 tablet  Refills: 3     methocarbamol 500 MG tablet  Commonly known as: ROBAXIN      Dose: 500 mg  Take 500 mg by mouth 3 times daily as needed  Refills: 0     * QUEtiapine 200 MG tablet  Commonly known as: SEROquel  Used for: Visit for preventive health examination, Fibromyalgia      Dose: 200 mg  Take 1 tablet (200 mg) by mouth at bedtime Pt takes half of a pill  Quantity: 90 tablet  Refills: 1     * QUEtiapine 100 MG tablet  Commonly known as: SEROquel      Dose: 150 mg  Take 150 mg by mouth every other day  Refills: 0           * This list has 2 medication(s) that are the same as other medications prescribed for you. Read the directions carefully, and ask your doctor or other care provider to review them with you.                   Where to get your medicines        These medications were sent to [facility] [place], [place] - [address]  [address] [address]      Phone: [phone]   acetaminophen 325 MG tablet  ibuprofen 800 MG tablet  ondansetron 4 MG ODT tab  senna-docusate 8.6-50 MG tablet       Information about where to get these medications is not yet available    Ask your nurse or doctor about these  medications  oxyCODONE 5 MG tablet                Discharge Disposition:     Discharged to home        Aileen Sevilla MD  OB/GYN Resident PGY-4  12:09 PM May 1, 2024         Staff MD Note    I evaluated the patient on the day of discharge.  We reviewed discharge instructions.  Patient stable for discharge.    Ariadne Pedraza MD

## 2024-04-30 NOTE — ED NOTES
Patient transferred to CT with this writer. Mostly tolerated transport except with some severe intermittent pain that last 10-15 seconds in middle abdomen.

## 2024-05-01 VITALS
DIASTOLIC BLOOD PRESSURE: 58 MMHG | TEMPERATURE: 98.8 F | BODY MASS INDEX: 22.82 KG/M2 | OXYGEN SATURATION: 98 % | HEART RATE: 95 BPM | WEIGHT: 137 LBS | SYSTOLIC BLOOD PRESSURE: 98 MMHG | HEIGHT: 65 IN | RESPIRATION RATE: 16 BRPM

## 2024-05-01 LAB
ERYTHROCYTE [DISTWIDTH] IN BLOOD BY AUTOMATED COUNT: 13.2 % (ref 10–15)
GLUCOSE BLDC GLUCOMTR-MCNC: 113 MG/DL (ref 70–99)
HCT VFR BLD AUTO: 28 % (ref 35–53)
HGB BLD-MCNC: 9.6 G/DL (ref 13.3–17.7)
MCH RBC QN AUTO: 29.7 PG (ref 26.5–33)
MCHC RBC AUTO-ENTMCNC: 34.3 G/DL (ref 31.5–36.5)
MCV RBC AUTO: 87 FL (ref 78–100)
PLATELET # BLD AUTO: 167 10E3/UL (ref 150–450)
RBC # BLD AUTO: 3.23 10E6/UL (ref 3.8–5.9)
WBC # BLD AUTO: 15 10E3/UL (ref 4–11)

## 2024-05-01 PROCEDURE — 120N000002 HC R&B MED SURG/OB UMMC

## 2024-05-01 PROCEDURE — 250N000011 HC RX IP 250 OP 636: Mod: JZ | Performed by: STUDENT IN AN ORGANIZED HEALTH CARE EDUCATION/TRAINING PROGRAM

## 2024-05-01 PROCEDURE — 250N000013 HC RX MED GY IP 250 OP 250 PS 637: Performed by: STUDENT IN AN ORGANIZED HEALTH CARE EDUCATION/TRAINING PROGRAM

## 2024-05-01 PROCEDURE — 85027 COMPLETE CBC AUTOMATED: CPT | Performed by: STUDENT IN AN ORGANIZED HEALTH CARE EDUCATION/TRAINING PROGRAM

## 2024-05-01 PROCEDURE — 250N000011 HC RX IP 250 OP 636: Performed by: STUDENT IN AN ORGANIZED HEALTH CARE EDUCATION/TRAINING PROGRAM

## 2024-05-01 PROCEDURE — 250N000013 HC RX MED GY IP 250 OP 250 PS 637

## 2024-05-01 PROCEDURE — 258N000003 HC RX IP 258 OP 636: Performed by: STUDENT IN AN ORGANIZED HEALTH CARE EDUCATION/TRAINING PROGRAM

## 2024-05-01 PROCEDURE — 82962 GLUCOSE BLOOD TEST: CPT

## 2024-05-01 PROCEDURE — 36415 COLL VENOUS BLD VENIPUNCTURE: CPT | Performed by: STUDENT IN AN ORGANIZED HEALTH CARE EDUCATION/TRAINING PROGRAM

## 2024-05-01 RX ORDER — SODIUM CHLORIDE, SODIUM LACTATE, POTASSIUM CHLORIDE, CALCIUM CHLORIDE 600; 310; 30; 20 MG/100ML; MG/100ML; MG/100ML; MG/100ML
INJECTION, SOLUTION INTRAVENOUS
Status: COMPLETED
Start: 2024-05-01 | End: 2024-05-01

## 2024-05-01 RX ORDER — OXYCODONE HYDROCHLORIDE 5 MG/1
5-10 TABLET ORAL EVERY 4 HOURS PRN
Qty: 6 TABLET | Refills: 0 | Status: SHIPPED | OUTPATIENT
Start: 2024-05-01 | End: 2024-05-15

## 2024-05-01 RX ADMIN — ONDANSETRON 4 MG: 2 INJECTION INTRAMUSCULAR; INTRAVENOUS at 08:34

## 2024-05-01 RX ADMIN — KETOROLAC TROMETHAMINE 15 MG: 15 INJECTION, SOLUTION INTRAMUSCULAR; INTRAVENOUS at 11:54

## 2024-05-01 RX ADMIN — ACETAMINOPHEN 975 MG: 325 TABLET, FILM COATED ORAL at 11:54

## 2024-05-01 RX ADMIN — SENNOSIDES AND DOCUSATE SODIUM 1 TABLET: 50; 8.6 TABLET ORAL at 08:29

## 2024-05-01 RX ADMIN — ACETAMINOPHEN 975 MG: 325 TABLET, FILM COATED ORAL at 05:56

## 2024-05-01 RX ADMIN — OXYCODONE HYDROCHLORIDE 5 MG: 5 TABLET ORAL at 08:34

## 2024-05-01 RX ADMIN — OXYCODONE HYDROCHLORIDE 5 MG: 5 TABLET ORAL at 13:38

## 2024-05-01 RX ADMIN — GABAPENTIN 300 MG: 300 CAPSULE ORAL at 08:29

## 2024-05-01 RX ADMIN — METRONIDAZOLE 500 MG: 500 INJECTION, SOLUTION INTRAVENOUS at 11:55

## 2024-05-01 RX ADMIN — SODIUM CHLORIDE, POTASSIUM CHLORIDE, SODIUM LACTATE AND CALCIUM CHLORIDE: 600; 310; 30; 20 INJECTION, SOLUTION INTRAVENOUS at 03:28

## 2024-05-01 ASSESSMENT — ACTIVITIES OF DAILY LIVING (ADL)
ADLS_ACUITY_SCORE: 18

## 2024-05-01 NOTE — PROGRESS NOTES
"  VS: BP 98/58 (BP Location: Left arm)   Pulse 95   Temp 98.8  F (37.1  C) (Oral)   Resp 16   Ht 1.651 m (5' 5\")   Wt 62.1 kg (137 lb)   LMP 04/15/2024 (Approximate)   SpO2 98%   BMI 22.80 kg/m     Output/Last BM: Voids spontaneously   Activity: Up ad yan   Skin/Dressing: 3 lap sites to abd, CDI with dermabond   Pain: Ongoing abd pain, scheduled meds and prn oxycodone effective   CMS: A/Ox4   Diet: Regular diet, good appetite   LDA: PIV to RUE x2, infusing - removed at discharge   Equipment:    Plan:    Additional Info:      DISCHARGE SUMMARY    Pt Discharging To Home   Transportation [employer]   AVS Given and Discussed Yes   Stoplight Tool Given and Discussed Yes   Medications Given Yes   Belongings Returned Remains with Patient   Comments Discharged 05/01/2024 at 1445       "

## 2024-05-01 NOTE — OP NOTE
[facility] Operative Note    Patient: Marck Fernandez  : 1995  MRN: [id number]    Date of Service: 2024    Pre-operative diagnosis:  Hemoperitoneum   Ruptured hemorrhagic cyst     Post-operative diagnosis:  Same s/p below procedure     Procedure: Exam under anesthesia, diagnostic laparoscopy, cauterization of right ovarian cyst bed, evacuation of hemoperitoneum     Surgeon: Jesusita Mckeon MD  Assistant: Aileen Sevilla MD PGY4    Anesthesia: GETA    EBL: 1000 mL hemoperitoneum, negligible blood loss from surgery  Urine: 600 mL clear urine  IV Fluids: 1000 mL crystalloid    Specimens: None   Drains: None  Implants: None    Complications: none apparent    Findings: Exam under anesthesia with small, mobile, anteverted uterus, no adnexal fullness appreciated. On laparoscopy no evidence of injury from entry, hemoperitoneum with organized clot, approximately 1000 ml. On pelvic inspection, small simple follicle with active bleeding from ovarian serosal defect. Serosa and cyst bed cauterized with good improvement of bleeding noted. Hemoperitoneum evacuated with suction and placement of organized clot into endocatch bag. Surgiflo placed in cyst bed. Excellent hemostasis noted. Patient stable to PACU.     Indications: Marck Fernandez is a 28 year old G0 non-binary person (they/them) who presented to the ED for acute abdominal pain with associated lightheadedness, nausea, vomiting; found to be tachycardic and hypotensive with positive FAST exam in ED. Stabilized and CT findings concerning for ruptured hemorrhagic cyst. Recommended to go to OR urgently for diagnostic laparoscopy with likely right cystectomy, possible salpingo-oophorectomy, possible laparotomy. Reviewed procedure details and risks including bleeding, infection, injury to other tissues/organs, need for additional procedure reviewed with patient. Questions answered. They are comfortable with plan.       Procedure:  The patient was taken  to the operating room where general endotracheal anesthesia was administered without difficulty. SCDs were in place. Preoperative antibiotics were not indicated. They were placed in the dorsal lithotomy position with feet in yellow fin stirrups in what was felt to be a neurologically safe position with arms tucked. An exam under anesthesia was performed with findings as above.     The patient was then prepped and draped in the usual sterile fashion. A chin catheter was placed. A sterile speculum was placed and cervix grasped at 12 o'clock using single tooth tenaculum. An acorn uterine manipulator was placed. Speculum removed. Attention was turned to the abdomen. The supraumbilical site was injected with 0.25% marcaine. An 11-blade scalpel was used to make a 5 mm vertical incision. A Veress needle was used to access the abdomen through this umbilical incision. Intraabdominal placement was suggested by saline drop test. CO2 gas was attached to the port and the opening pressure was 5 mmHg. Pneumoperitoneum was achieved with good tympany of the abdomen until maximum of 15 mm Hg pressure was reached. The Veress needle was then removed and a 5 mm  laparoscopic port was placed. Trochar was removed and the 5 mm laparoscope was placed into the port. Intraabdominal entry was confirmed, abdomen surveyed with findings as above and no evidence of injury from Veress or port placement noted. A second 5 mm port was placed in the RLQ approximately 2 cm superior and 2 cm medial to anterior superior iliac spine under direct visualization with no evidence of injury noted.  A final 5 mm port was placed in the LLQ of the abdomen with a similar technique under direct visualization with no injury noted from placement.    Suction was used to clear clot and blood out of the pelvis. A serosal defect over a suspected follicular cyst was noted on the right that was actively bleeding. The serosal edge was grasped with an atraumatic grasper and  elevated. The serosal defect was expanded. There was no distinct cyst wall that was able to be removed, and bleeding was noted along the cyst bed. Cautery was used to control the bleeding from the cyst bed with good hemostasis. The remainder of the hemoperitoneum was then cleared from the abdomen with suction. A 5 mm Endocatch bag was used to remove organized clot that was unable to be suctioned. Surgiflo was then placed in the cyst cavity along the cyst bed with excellent hemostasis noted. A final visual sweep of the pelvis showed no further pathology and minimal remaining blood.     All instruments removed from abdomen. The pneumoperitoneum was expelled. The patient was given positive pressure breaths to facilitate removal of pneumoperitoneum. The ports were removed and skin incisions were closed using 4-0 monocryl and Dermabond. Good hemostasis was noted. Speculum was placed and all instruments removed from vagina. Good hemostasis was noted after application of silver nitrate on the cervix.    Instrument, sponge, and needle counts were correct x2. Dr. Mckeon was present and scrubbed for the entire procedure. The patient was extubated in the operating room and transferred to the PACU in stable condition.      Aileen Sevilla MD  OB/GYN Resident PGY-4  7:14 PM April 30, 2024      I was present and scrubbed throughout the procedure,  I agree with the note above  Jesusita Mckeon MD

## 2024-05-01 NOTE — PROGRESS NOTES
"Gynecology Daily Progress Note  05/01/2024    Marck Fernandez  [id number]    POD#1 s/p diagnostic laparoscopy, cauterization of R cyst bed, evacuation of hemoperitoneum    24 hour events:   - Presented to ED > to OR  - 2U pRBC  - Surgery, uncomplicated    Subjective: Doing well this AM. Pain is improved compared to when they arrived, well controlled with PO pain medications. Mild nausea, no vomiting, has not eaten yet but tolerating fluids. Voiding spontaneously, had BM yesterday. No vaginal bleeding. No CP or SOB. Ambulating without dizziness or lightheadedness.    Objective:  Patient Vitals for the past 24 hrs:   BP Temp Temp src Pulse Resp SpO2 Height Weight   05/01/24 0556 111/64 99  F (37.2  C) Oral 112 18 100 % -- --   05/01/24 0010 114/63 (!) 96.6  F (35.9  C) Oral 116 18 99 % -- --   04/30/24 1924 134/63 97.8  F (36.6  C) Oral 63 17 100 % -- --   04/30/24 1830 130/76 -- -- 78 17 98 % -- --   04/30/24 1815 130/69 -- -- 64 20 95 % -- --   04/30/24 1800 122/74 97.7  F (36.5  C) Temporal 87 16 96 % -- --   04/30/24 1756 122/74 -- -- 90 21 99 % -- --   04/30/24 1745 130/76 -- -- 87 (!) 31 99 % -- --   04/30/24 1730 131/78 -- -- 106 17 100 % -- --   04/30/24 1718 121/81 98.1  F (36.7  C) Temporal 104 12 100 % -- --   04/30/24 1525 114/71 98.3  F (36.8  C) Oral 66 14 100 % -- --   04/30/24 1415 107/66 -- -- 72 -- 99 % -- --   04/30/24 1343 113/63 97.7  F (36.5  C) Oral -- 16 100 % -- --   04/30/24 1316 105/70 98.2  F (36.8  C) Oral 62 18 100 % -- --   04/30/24 1304 110/69 98.5  F (36.9  C) Oral 69 18 100 % -- --   04/30/24 1300 117/67 -- -- -- -- 100 % -- --   04/30/24 1235 110/63 -- -- 73 -- 100 % -- --   04/30/24 1150 (!) 89/58 97.9  F (36.6  C) Oral (!) 126 20 99 % 1.651 m (5' 5\") 62.1 kg (137 lb)       GEN - NAD, sitting comfortably in bed  CV - Warm and well perfused   PULM - Breathing comfortably on room air   ABD - Soft, non-distended, appropriately tender  INC - c/d/I w/ skin glue  EXT - No edema, " non-tender, SCDs in place    I/O last 3 completed shifts:  In: 1496 [P.O.:60; I.V.:1136]  Out: 1600 [Urine:600; Blood:1000]    LABS:  BMP  Recent Labs   Lab 04/30/24  1216 04/30/24  1210    138   POTASSIUM 3.5 3.5   CHLORIDE  --  102   TASIA  --  8.7   CO2  --  23   BUN  --  14.3   CR  --  0.82   * 161*     CBC  Recent Labs   Lab 04/30/24  1752 04/30/24  1216 04/30/24  1210   WBC 17.8*  --  14.5*   RBC 3.78*  --  4.10   HGB 11.3* 12.2 12.4*   HCT 33.3* 36 36.8   MCV 88  --  90   MCH 29.9  --  30.2   MCHC 33.9  --  33.7   RDW 12.9  --  11.9     --  235     INR  Recent Labs   Lab 04/30/24  1210   INR 1.12     LFTs   Recent Labs   Lab 04/30/24  1210   AST 15   ALT 9   ALKPHOS 48   BILITOTAL 0.4       Assessment: Marck Fernandez is a 28 year old now POD#1 s/p diagnostic laparoscopy, cauterization of R cyst bed, evacuation of hemoperitoneum. Doing well and meeting goals for discharge home.     Plan:  FEN: ADAT, discontinue mIVF today   Pain: Continue Tylenol, ibuprofen, prn oxycodone  Heme: Symptomatic acute blood loss anemia on presentation, now s/p 2U pRBC, AM CBC pending.   CV:  NI  Pulm:  NI  GI:  Bowel regimen, antiemetics PRN   :  S/p chin, voiding spontaneously  ID:  Afebrile  Endo:  NI  MSK/Neuro/Psych:  NI  PPX:  SCDs    Dispo:  Discharge to home today pending CBC      Aileen Sevilla MD  OB/GYN Resident PGY-4  8:00 AM May 1, 2024        Staff MD Note    I appreciate the note by Dr. Sevilla.  Any necessary changes have been made by me.  I saw and evaluated the patient and agree with the findings and plan of care as documented in the note.  Doing well, meeting discharge criteria. Will facilitate postop visit in 2 weeks.  Given clinic info for followup and dispo planning.    Ariadne Pedraza MD

## 2024-05-01 NOTE — PHARMACY-ADMISSION MEDICATION HISTORY
Pharmacist Admission Medication History    Admission medication history is complete. The information provided in this note is only as accurate as the sources available at the time of the update.    Information Source(s): Patient via in-person, medication fill history    Pertinent Information: none    Changes made to PTA medication list:  Added: None  Deleted: Duplicate Ondansetron  Changed: Gabapentin 300 mg po tid >> 300 mg po daily  Methocarbamol si mg po tid prn  Quetiapine 150 mg po HS>> 150 mg po q48h, Quetiapine 200 mg po HS >> 200 mg po q48h. Pt alternates 150 mg with 200 mg at bedtime.     Allergies reviewed with patient and updates made in EHR: yes    Medication History Completed By: Noemi Good RPH 2024 8:35 PM    PTA Med List   Medication Sig Last Dose    gabapentin (NEURONTIN) 300 MG capsule Take 1 capsule (300 mg) by mouth 3 times daily (Patient taking differently: Take 300 mg by mouth daily) 2024 at 0100    LORazepam (ATIVAN) 0.5 MG tablet TAKE 1 TABLET BY MOUTH ONCE A DAY AS NEEDED FOR INCREASED ANXIETY OR PANIC ATTACK Past Month    meloxicam (MOBIC) 15 MG tablet Take 1 tablet (15 mg) by mouth daily as needed for moderate pain or pain 2024 at 0100    methocarbamol (ROBAXIN) 500 MG tablet Take 500 mg by mouth 3 times daily as needed More than a month    ondansetron (ZOFRAN ODT) 4 MG ODT tab Take 1 tablet (4 mg) by mouth every 8 hours as needed for nausea Unknown    QUEtiapine (SEROQUEL) 100 MG tablet Take 150 mg by mouth every other day 2024 at 0100    QUEtiapine (SEROQUEL) 200 MG tablet Take 1 tablet (200 mg) by mouth at bedtime Pt takes half of a pill (Patient taking differently: Take 200 mg by mouth every other day Pt alternated 150 mg with 200 mg) Past Week

## 2024-05-01 NOTE — PROGRESS NOTES
[id number]  Pt A&OX4 with anxiety PRN Ativan administered. Co ABD pain 6-8/10 managed using IV dilaudid, PO oxycodone. Pt co nausea PRN IV Zofran and Compazine were administered. Incision dry and clean with derma bond open to air. 2 RPIV one saline locked and one infusing patent and WNL. Continent bowel and bladder LBM PTA. Pt has capno on however pt breath shallow and hyperventilated. Charge nurse and provider were notified. Significant other is on BS and precipitate in pt's care and emotional support. Call light within pt's reach.

## 2024-05-01 NOTE — PLAN OF CARE
Goal Outcome Evaluation:    Alert and oriented X4, continents bowel and bladder, voiding adequately without difficulty in the toilet. Report pain 4/10, denies chest pain, shortness of breath, nausea and vomiting after Zofran administration. LBM 5/1/24. , No acute change overnight. Continue POC.

## 2024-05-01 NOTE — DISCHARGE INSTRUCTIONS
Please call [phone] to schedule a postoperative visit with Dr. Mckeon or one of their partners.

## 2024-05-04 NOTE — PROGRESS NOTES
Marck called after hours line with vaginal bleeding since their surgery on 4/30. Bleeding initially was spotting and is now a bit heavier. Not enough to use a tampon. Period would not normally be due for another week. No fevers, dizziness. Not on hormonal menstrual regulation but does use some herbal medication.     Discussed if hemorrhagic cyst was corpus luteum, removing it may have caused a progesterone drop that triggered any early menses. If bothersome, a short course of OCPs could be used to stop the bleeding but it will likely resolve with the next cycle. Discussed warning signs of heavy bleeding, dizziness or fever to present to the ER.    Stacy Denis MD

## 2024-05-05 LAB — BACTERIA BLD CULT: NO GROWTH

## 2024-05-15 ENCOUNTER — OFFICE VISIT (OUTPATIENT)
Dept: OBGYN | Facility: CLINIC | Age: 29
End: 2024-05-15
Attending: STUDENT IN AN ORGANIZED HEALTH CARE EDUCATION/TRAINING PROGRAM
Payer: COMMERCIAL

## 2024-05-15 VITALS
SYSTOLIC BLOOD PRESSURE: 122 MMHG | BODY MASS INDEX: 26.29 KG/M2 | HEART RATE: 73 BPM | WEIGHT: 158 LBS | DIASTOLIC BLOOD PRESSURE: 81 MMHG

## 2024-05-15 DIAGNOSIS — Z09 POSTOP CHECK: Primary | ICD-10-CM

## 2024-05-15 DIAGNOSIS — D62 ANEMIA DUE TO BLOOD LOSS, ACUTE: ICD-10-CM

## 2024-05-15 PROCEDURE — G0463 HOSPITAL OUTPT CLINIC VISIT: HCPCS | Performed by: STUDENT IN AN ORGANIZED HEALTH CARE EDUCATION/TRAINING PROGRAM

## 2024-05-15 PROCEDURE — 99212 OFFICE O/P EST SF 10 MIN: CPT | Mod: GE | Performed by: OBSTETRICS & GYNECOLOGY

## 2024-05-15 RX ORDER — FERROUS SULFATE 325(65) MG
325 TABLET ORAL EVERY OTHER DAY
Qty: 60 TABLET | Refills: 0 | Status: SHIPPED | OUTPATIENT
Start: 2024-05-15

## 2024-05-15 NOTE — NURSING NOTE
Chief Complaint   Patient presents with    Surgical Followup     2 weeks post op check DOS 4/30/2024    Fany Gee LPN

## 2024-05-15 NOTE — PATIENT INSTRUCTIONS
Thank you for trusting us with your care!     If you need to contact us for questions about:  Symptoms, Scheduling & Medical Questions; Non-urgent (2-3 day response) Murray message, Urgent (needing response today) [phone] (if after 3:30pm next day response)   Prescriptions: Please call your Pharmacy   Billing: [place] [phone] or [facility]:[phone]

## 2024-05-15 NOTE — PROGRESS NOTES
[facility]  Postoperative Visit  5/15/2024    S: Marck Fernandez (they/them) is a 28 year old G0 non-binary person who is here for post-operative visit following diagnostic laparoscopy, cauterization of right ovarian cyst bed, evacuation of hemoperitoneum on 4/30/24. They were discharged on POD#1 and report that they have been feeling well since surgery. Pain is resolved. Had some spotting for a bit after surgery, but this has improved. Now 5 days late on cycle, but not worried. Did feel fatigued after surgery, but now starting to feel more normal. No lightheadedness or dizziness, no palpitations. Incisions are healing well. Has some forms from work that need to be completed, but no other concerns today.     Gyn Hx:   - Menses: regular, monthly     - Contraception: none, not sexually active with partners who have sperm  - H/o STI: not discussed   - Last Pap: 11/2023, NILM  - Pap Hx: remote h/o abnormal pap smear    O:   /81   Pulse 73   Wt 71.7 kg (158 lb)   LMP 04/15/2024 (Approximate)   BMI 26.29 kg/m    Exam:   Gen: Well appearing, NAD  CV: Warm and well perfused  Pulm: Breathing comfortably on room air   Abd: Incision well approximated and well healing without erythema or drainage. Soft, non-distended.   Ext: Moving all extremities    Labs:   Hemoglobin   Date Value Ref Range Status   05/01/2024 9.6 (L) 13.3 - 17.7 g/dL Final     Comment:     Sex Specific Reference Ranges:    Female  11.7-15.7  g/dL  Male    13.3-17.7   g/dL       A: 28 year old G0 non-binary person who is two weeks postop s/p diagnostic laparoscopy, cauterization of cyst bed, and evacuation of hemoperitonum for ruptured hemorrhagic cyst. Doing well, no concerns today.     P:   # Routine postop  - Pain: resolved, Tylenol or ibuprofen prn   - Incision: well healing, no signs of infection   - Helped patient send work forms to RN pool for completion  - Return precautions reviewed     # Acute blood loss anemia  # Fatigue, improving  -  Rx for ferrous sulfate given today due to acute blood loss anemia at time of procedure    Patient staffed with Dr. Pascal.      Bennie Sevilla MD  OB/GYN Resident PGY-4  4:34 PM May 15, 2024      The Patient was seen in Resident Continuity Clinic by BENNIE SEVILLA.  I reviewed the history & exam. Assessment and plan were jointly made.    Mary Pascal MD

## 2024-05-15 NOTE — LETTER
5/15/2024       RE: Marck Fernandez  [address]  [facility] [place] [facility] [address]     Dear Colleague,    Thank you for referring your patient, Marck Fernandez, to the [facility] [place] [facility]. Please see a copy of my visit note below.    [facility]  Postoperative Visit  5/15/2024    S: Marck Fernandez (they/them) is a 28 year old G0 non-binary person who is here for post-operative visit following diagnostic laparoscopy, cauterization of right ovarian cyst bed, evacuation of hemoperitoneum on 4/30/24. They were discharged on POD#1 and report that they have been feeling well since surgery. Pain is resolved. Had some spotting for a bit after surgery, but this has improved. Now 5 days late on cycle, but not worried. Did feel fatigued after surgery, but now starting to feel more normal. No lightheadedness or dizziness, no palpitations. Incisions are healing well. Has some forms from work that need to be completed, but no other concerns today.     Gyn Hx:   - Menses: regular, monthly     - Contraception: none, not sexually active with partners who have sperm  - H/o STI: not discussed   - Last Pap: 11/2023, NILM  - Pap Hx: remote h/o abnormal pap smear    O:   /81   Pulse 73   Wt 71.7 kg (158 lb)   LMP 04/15/2024 (Approximate)   BMI 26.29 kg/m    Exam:   Gen: Well appearing, NAD  CV: Warm and well perfused  Pulm: Breathing comfortably on room air   Abd: Incision well approximated and well healing without erythema or drainage. Soft, non-distended.   Ext: Moving all extremities    Labs:   Hemoglobin   Date Value Ref Range Status   05/01/2024 9.6 (L) 13.3 - 17.7 g/dL Final     Comment:     Sex Specific Reference Ranges:    Female  11.7-15.7  g/dL  Male    13.3-17.7   g/dL       A: 28 year old G0 non-binary person who is two weeks postop s/p diagnostic laparoscopy, cauterization of cyst bed, and evacuation of hemoperitonum for  ruptured hemorrhagic cyst. Doing well, no concerns today.     P:   # Routine postop  - Pain: resolved, Tylenol or ibuprofen prn   - Incision: well healing, no signs of infection   - Helped patient send work forms to RN pool for completion  - Return precautions reviewed     # Acute blood loss anemia  # Fatigue, improving  - Rx for ferrous sulfate given today due to acute blood loss anemia at time of procedure    Patient staffed with Dr. Pascal.      Bennie Sevilla MD  OB/GYN Resident PGY-4  4:34 PM May 15, 2024      The Patient was seen in Resident Continuity Clinic by BENNIE SEVILLA.  I reviewed the history & exam. Assessment and plan were jointly made.    Mary Pascal MD

## 2024-05-30 ENCOUNTER — MYC MEDICAL ADVICE (OUTPATIENT)
Dept: OBGYN | Facility: CLINIC | Age: 29
End: 2024-05-30
Payer: COMMERCIAL

## 2024-05-31 NOTE — TELEPHONE ENCOUNTER
"Writer routed off to Ryan Baig MD who had responded to pt earlier with recommending a follow up appt. Pt not able to be seen until 7/3, writer routed to provider to see if he had any further recommendations for pt in the meantime. Provider secure chatted writer \"I would say that they can continue to wait and see. I know that they said no concern for pregnancy though it never hurts to check if they are active with a male partner. There is nothing I can see in the operative note that seems as if it would cause them to not have a period other than stress of surgery itself\". Writer Timi messaged pt to continue to watch and monitor, suggested to call clinic to either get wait listed for an earlier appt if they wanted to or see if there were any recent cancelled appt. And to call clinic or Timi if any further questions/concerns/sx that come up.   "

## 2024-06-14 ENCOUNTER — OFFICE VISIT (OUTPATIENT)
Dept: FAMILY MEDICINE | Facility: CLINIC | Age: 29
End: 2024-06-14
Payer: COMMERCIAL

## 2024-06-14 ENCOUNTER — TELEPHONE (OUTPATIENT)
Dept: OBGYN | Facility: CLINIC | Age: 29
End: 2024-06-14

## 2024-06-14 ENCOUNTER — NURSE TRIAGE (OUTPATIENT)
Dept: NURSING | Facility: CLINIC | Age: 29
End: 2024-06-14

## 2024-06-14 VITALS
HEART RATE: 60 BPM | OXYGEN SATURATION: 99 % | DIASTOLIC BLOOD PRESSURE: 75 MMHG | TEMPERATURE: 98.3 F | RESPIRATION RATE: 20 BRPM | SYSTOLIC BLOOD PRESSURE: 111 MMHG | WEIGHT: 136 LBS | BODY MASS INDEX: 22.63 KG/M2

## 2024-06-14 DIAGNOSIS — R10.31 RIGHT LOWER QUADRANT PAIN: Primary | ICD-10-CM

## 2024-06-14 DIAGNOSIS — N80.129 ENDOMETRIOMA OF OVARY: ICD-10-CM

## 2024-06-14 DIAGNOSIS — Z87.42 HISTORY OF OVARIAN CYST: ICD-10-CM

## 2024-06-14 LAB
ALBUMIN UR-MCNC: NEGATIVE MG/DL
APPEARANCE UR: CLEAR
BACTERIA #/AREA URNS HPF: ABNORMAL /HPF
BASOPHILS # BLD AUTO: 0 10E3/UL (ref 0–0.2)
BASOPHILS NFR BLD AUTO: 0 %
BILIRUB UR QL STRIP: NEGATIVE
COLOR UR AUTO: YELLOW
EOSINOPHIL # BLD AUTO: 0.2 10E3/UL (ref 0–0.7)
EOSINOPHIL NFR BLD AUTO: 2 %
ERYTHROCYTE [DISTWIDTH] IN BLOOD BY AUTOMATED COUNT: 12 % (ref 10–15)
GLUCOSE UR STRIP-MCNC: NEGATIVE MG/DL
HCT VFR BLD AUTO: 40.9 % (ref 35–53)
HGB BLD-MCNC: 13.5 G/DL (ref 11.7–17.7)
HGB UR QL STRIP: NEGATIVE
IMM GRANULOCYTES # BLD: 0 10E3/UL
IMM GRANULOCYTES NFR BLD: 0 %
KETONES UR STRIP-MCNC: NEGATIVE MG/DL
LEUKOCYTE ESTERASE UR QL STRIP: NEGATIVE
LYMPHOCYTES # BLD AUTO: 1.9 10E3/UL (ref 0.8–5.3)
LYMPHOCYTES NFR BLD AUTO: 26 %
MCH RBC QN AUTO: 29.5 PG (ref 26.5–33)
MCHC RBC AUTO-ENTMCNC: 33 G/DL (ref 31.5–36.5)
MCV RBC AUTO: 90 FL (ref 78–100)
MONOCYTES # BLD AUTO: 0.4 10E3/UL (ref 0–1.3)
MONOCYTES NFR BLD AUTO: 6 %
NEUTROPHILS # BLD AUTO: 4.7 10E3/UL (ref 1.6–8.3)
NEUTROPHILS NFR BLD AUTO: 65 %
NITRATE UR QL: NEGATIVE
PH UR STRIP: 7.5 [PH] (ref 5–8)
PLATELET # BLD AUTO: 234 10E3/UL (ref 150–450)
RBC # BLD AUTO: 4.57 10E6/UL (ref 3.8–5.9)
RBC #/AREA URNS AUTO: ABNORMAL /HPF
SP GR UR STRIP: 1.02 (ref 1–1.03)
SQUAMOUS #/AREA URNS AUTO: ABNORMAL /LPF
UROBILINOGEN UR STRIP-ACNC: 0.2 E.U./DL
WBC # BLD AUTO: 7.3 10E3/UL (ref 4–11)
WBC #/AREA URNS AUTO: ABNORMAL /HPF

## 2024-06-14 PROCEDURE — 85025 COMPLETE CBC W/AUTO DIFF WBC: CPT | Performed by: FAMILY MEDICINE

## 2024-06-14 PROCEDURE — 36415 COLL VENOUS BLD VENIPUNCTURE: CPT | Performed by: FAMILY MEDICINE

## 2024-06-14 PROCEDURE — G2211 COMPLEX E/M VISIT ADD ON: HCPCS | Performed by: FAMILY MEDICINE

## 2024-06-14 PROCEDURE — 99213 OFFICE O/P EST LOW 20 MIN: CPT | Performed by: FAMILY MEDICINE

## 2024-06-14 PROCEDURE — 81001 URINALYSIS AUTO W/SCOPE: CPT | Performed by: FAMILY MEDICINE

## 2024-06-14 NOTE — TELEPHONE ENCOUNTER
Pt scheduled for 11:40 with other clinic. Nothing available at [facility] prior to patient's scheduled appointment.

## 2024-06-14 NOTE — PROGRESS NOTES
Assessment & Plan     (R10.31) Right lower quadrant pain  (primary encounter diagnosis)  Plan: CBC with platelets and differential, UA with         Microscopic - lab collect, US Pelvic Complete         with Transvaginal  (Z87.42) History of ovarian cyst  Plan: US Pelvic Complete with Transvaginal  EHR reviewed.   Past medical history, problem list, past surgical history, family history, social history, medications reviewed, updated, reconciled.   I reviewed recent ED admission, hospital admission, operative report, post operative follow up.   There are no signs of acute abdomen.   Due to patient history though and recent illness, would like to rule out recurrent ovarian pathology. Will collect pelvic ultrasound which is not available here and will be scheduled. There is no specialty schedule available at this clinic Marck will await a call. Will check cbc and urinalysis with above history. We discussed all common etiology of pelvic pain and the possibility of post operative pain, though we are already six weeks out.   Next steps to be determined after review of imaging.   In the mean time recommend heating application or consideration of ibuprofen, but Marck is hesitant to mask the pain. Marck is aware of reasons to call or be seen urgently.   If the above findings are normal and the pain is persistent, I recommended follow up with OB gyn for other recommendations.       The longitudinal plan of care for the diagnosis(es)/condition(s) as documented were addressed during this visit. Due to the added complexity in care, I will continue to support Marck in the subsequent management and with ongoing continuity of care.                  Nabila Acharya is a 28 year old, presenting for the following health issues:  Abdominal Pain (X 2 days. Sharp and cramp pain. Pain is more on right side.)      6/14/2024    11:25 AM   Additional Questions   Roomed by Chevy SALDIVAR   Accompanied by Partner, Sathya     History of Present Illness        Reason for visit:  Ovarian pain    Marck eats 2-3 servings of fruits and vegetables daily.Marck consumes 1 sweetened beverage(s) daily.Marck exercises with enough effort to increase Marck's heart rate 30 to 60 minutes per day.  Marck exercises with enough effort to increase Marck's heart rate 3 or less days per week. Marck is missing 1 dose(s) of medications per week.     [age] year old non binary person here for concerns of right lower pelvic pain. This started about two days ago. They started complaining to partner about this pain, but it is intermittent. Yesterday was a 7/10 now about 4 or 5/10. They are worried about the possibility of another ruptured ovarian cyst or wondering if this is normal post operative pain. On 4/20/24, presented to the [place] ED with severe pelvic pain after intercourse, was found to have a ruptured right ovarian cyst with hemoperitoneum. The right cyst was cauterized and there was evacuation of the hemoperitoneum. Follow up was completed two weeks later on 5/15/24. Everything seemed to be fine.  LMP is 6/4/24 and feels like this is not ovulation pain. Marck tried to get in with the OB gyn providers that did the surgery and follow up but could not get in. No fever. Some frequency. No abnormal discharge. Normal bowel movements. Just starting taking iron supplements.  Is tolerating diet with no appetite changes.     Past Medical History:   Diagnosis Date    Abnormal Pap smear of cervix     ADHD (attention deficit hyperactivity disorder)     Anxiety     Depression 11/20/2023    Eating disorder     Fibromyalgia 11/20/2023    Irritable bowel syndrome     Trichotillomania      Past Surgical History:   Procedure Laterality Date    LAPAROSCOPIC CYSTECTOMY OVARIAN (ONCOLOGY) Right 4/30/2024    Procedure: exploration Laparoscopy with evacuation of hemoperitoneum, cauterization of right ovarian cyst;  Surgeon: Jesusita Mckeon MD;  Location: [facility] OR    top surgery/ double masectomy  Bilateral      Family History   Problem Relation Age of Onset    Joint hypermobility Mother     Joint hypermobility Father     Spine Problems Father     Alzheimer Disease Maternal Grandmother     Coronary Artery Disease Maternal Grandfather      Social History     Socioeconomic History    Marital status: Single     Spouse name: Not on file    Number of children: Not on file    Years of education: Not on file    Highest education level: Not on file   Occupational History    Not on file   Tobacco Use    Smoking status: Never     Passive exposure: Never    Smokeless tobacco: Never   Vaping Use    Vaping status: Never Used   Substance and Sexual Activity    Alcohol use: Never    Drug use: Yes     Types: Marijuana     Comment: for pain    Sexual activity: Yes     Partners: Female     Birth control/protection: None   Other Topics Concern    Not on file   Social History Narrative    Not on file     Social Determinants of Health     Financial Resource Strain: Low Risk  (4/29/2024)    Financial Resource Strain     Within the past 12 months, have you or your family members you live with been unable to get utilities (heat, electricity) when it was really needed?: No   Food Insecurity: High Risk (4/29/2024)    Food Insecurity     Within the past 12 months, did you worry that your food would run out before you got money to buy more?: Yes     Within the past 12 months, did the food you bought just not last and you didn t have money to get more?: No   Transportation Needs: Low Risk  (4/29/2024)    Transportation Needs     Within the past 12 months, has lack of transportation kept you from medical appointments, getting your medicines, non-medical meetings or appointments, work, or from getting things that you need?: No   Physical Activity: Not on file   Stress: Not on file   Social Connections: Not on file   Interpersonal Safety: Low Risk  (4/29/2024)    Interpersonal Safety     Do you feel physically and emotionally safe where you  currently live?: Yes     Within the past 12 months, have you been hit, slapped, kicked or otherwise physically hurt by someone?: No     Within the past 12 months, have you been humiliated or emotionally abused in other ways by your partner or ex-partner?: No   Housing Stability: Low Risk  (4/29/2024)    Housing Stability     Do you have housing? : Yes     Are you worried about losing your housing?: No         Objective    /75 (BP Location: Right arm)   Pulse 60   Temp 98.3  F (36.8  C) (Oral)   Resp 20   Wt 61.7 kg (136 lb)   LMP 06/09/2024 (Exact Date)   SpO2 99%   BMI 22.63 kg/m    Body mass index is 22.63 kg/m .  Physical Exam   GENERAL: alert and no distress  EYES: Eyes grossly normal to inspection, PERRL and conjunctivae and sclerae normal  NECK: no adenopathy, no asymmetry, masses, or scars  RESP: lungs clear to auscultation - no rales, rhonchi or wheezes  CV: regular rate and rhythm, normal S1 S2, no S3 or S4, no murmur, click or rub, no peripheral edema  ABDOMEN: soft, mild tenderness RLQ, no hepatosplenomegaly, no masses and bowel sounds normal, no rebound or guarding  MS: no gross musculoskeletal defects noted, no edema    Results for orders placed or performed in visit on 06/14/24 (from the past 24 hour(s))   CBC with platelets and differential    Narrative    The following orders were created for panel order CBC with platelets and differential.  Procedure                               Abnormality         Status                     ---------                               -----------         ------                     CBC with platelets and d...[[id number]]                      Final result                 Please view results for these tests on the individual orders.   CBC with platelets and differential   Result Value Ref Range    WBC Count 7.3 4.0 - 11.0 10e3/uL    RBC Count 4.57 3.80 - 5.90 10e6/uL    Hemoglobin 13.5 11.7 - 17.7 g/dL    Hematocrit 40.9 35.0 - 53.0 %    MCV 90 78 - 100 fL     "MCH 29.5 26.5 - 33.0 pg    MCHC 33.0 31.5 - 36.5 g/dL    RDW 12.0 10.0 - 15.0 %    Platelet Count 234 150 - 450 10e3/uL    % Neutrophils 65 %    % Lymphocytes 26 %    % Monocytes 6 %    % Eosinophils 2 %    % Basophils 0 %    % Immature Granulocytes 0 %    Absolute Neutrophils 4.7 1.6 - 8.3 10e3/uL    Absolute Lymphocytes 1.9 0.8 - 5.3 10e3/uL    Absolute Monocytes 0.4 0.0 - 1.3 10e3/uL    Absolute Eosinophils 0.2 0.0 - 0.7 10e3/uL    Absolute Basophils 0.0 0.0 - 0.2 10e3/uL    Absolute Immature Granulocytes 0.0 <=0.4 10e3/uL    Narrative    The generation of reference intervals for this test is currently based on binary male or female sex. If the electronic health record information indicates another gender identity or if Legal Sex is recorded as \"Unknown\", both male and female reference intervals are provided where applicable, and should be considered according to the individual's appropriate clinical context.           Signed Electronically by: Harrison Cordoba MD      Prior to immunization administration, verified patients identity using patient s name and date of birth. Please see Immunization Activity for additional information.     Screening Questionnaire for Adult Immunization    Are you sick today?   No   Do you have allergies to medications, food, a vaccine component or latex?   Yes   Have you ever had a serious reaction after receiving a vaccination?   No   Do you have a long-term health problem with heart, lung, kidney, or metabolic disease (e.g., diabetes), asthma, a blood disorder, no spleen, complement component deficiency, a cochlear implant, or a spinal fluid leak?  Are you on long-term aspirin therapy?   No   Do you have cancer, leukemia, HIV/AIDS, or any other immune system problem?   No   Do you have a parent, brother, or sister with an immune system problem?   No   In the past 3 months, have you taken medications that affect  your immune system, such as prednisone, other steroids, or anticancer " drugs; drugs for the treatment of rheumatoid arthritis, Crohn s disease, or psoriasis; or have you had radiation treatments?   No   Have you had a seizure, or a brain or other nervous system problem?   No   During the past year, have you received a transfusion of blood or blood    products, or been given immune (gamma) globulin or antiviral drug?   Yes   For women: Are you pregnant or is there a chance you could become       pregnant during the next month?   No   Have you received any vaccinations in the past 4 weeks?   No     Immunization questionnaire was positive for at least one answer.  Notified provider.      Patient instructed to remain in clinic for 15 minutes afterwards, and to report any adverse reactions.     Screening performed by Chevy Claros MA on 6/14/2024 at 11:34 AM.

## 2024-06-14 NOTE — TELEPHONE ENCOUNTER
[facility] Call Center    Phone Message    May a detailed message be left on voicemail: yes     Reason for Call: Other: Patient was triaged today for ovarian sharp pain and was able to get scheduled with a provider at [facility]. But she is wondering if there was anything available for her to be seen today at [facility]. Writer only able to find 6/25/24 with Dr. Carreon. Please advise. Thank you     Action Taken: Message routed to:  Other: [facility]    Travel Screening: Not Applicable     Date of Service:

## 2024-06-14 NOTE — TELEPHONE ENCOUNTER
Triage call  Patient calling to report that  they have been having for the past 2 days they are intermittent . They are sharp stabbing  pains they rate at   7/10 and are around her around her ovaries.they also report her cycle was late by 25 days.  They report the pain at a 2/10 right now.  She had a ruptured cyst on their ovaries on 4/30/2024    Per protocol see in office today.  Care advice given.  Verbalizes understanding and agrees with plan. Transferred to scheduling for a appointment.     Darshana Cat RN   [facility] Nurse Advisor  6:22 AM 6/14/2024    Reason for Disposition   MODERATE pain (e.g., interferes with normal activities that comes and goes (cramps) lasts > 24 hours  (Exception: Pain with Vomiting or Diarrhea - see that Protocol.)    Additional Information   Negative: Passed out (i.e., fainted, collapsed and was not responding)   Negative: Shock suspected (e.g., cold/pale/clammy skin, too weak to stand, low BP, rapid pulse)   Negative: Sounds like a life-threatening emergency to the triager   Negative: Followed an abdomen (stomach) injury   Negative: Chest pain   Negative: Abdominal pain and pregnant < 20 weeks   Negative: Abdominal pain and pregnant 20 or more weeks   Negative: Pain is mainly in upper abdomen (if needed ask: 'is it mainly above the belly button?')   Negative: Abdomen bloating or swelling are main symptoms   Negative: SEVERE abdominal pain (e.g., excruciating)   Negative: Vomiting red blood or black (coffee ground) material   Negative: Blood in bowel movements  (Exception: Blood on surface of BM with constipation.)   Negative: Black or tarry bowel movements  (Exception: Chronic-unchanged black-grey BMs AND is taking iron pills or Pepto-Bismol.)   Negative: MILD TO MODERATE constant pain lasting > 2 hours   Negative: Vomiting bile (green color)   Negative: Patient sounds very sick or weak to the triager   Negative: Vomiting and abdomen looks much more swollen than usual    Negative: White of the eyes have turned yellow (i.e., jaundice)   Negative: Blood in urine (red, pink, or tea-colored)   Negative: Fever > 103 F (39.4 C)   Negative: Fever > 101 F (38.3 C) and over 60 years of age   Negative: Fever > 100.0 F (37.8 C) and has diabetes mellitus or a weak immune system (e.g., HIV positive, cancer chemotherapy, organ transplant, splenectomy, chronic steroids)   Negative: Fever > 100.0 F (37.8 C) and bedridden (e.g., CVA, chronic illness, recovering from surgery)   Negative: Pregnant or could be pregnant (i.e., missed last menstrual period)    Protocols used: Abdominal Pain - Female-A-[place]

## 2024-06-19 ENCOUNTER — ANCILLARY PROCEDURE (OUTPATIENT)
Dept: ULTRASOUND IMAGING | Facility: CLINIC | Age: 29
End: 2024-06-19
Attending: FAMILY MEDICINE
Payer: COMMERCIAL

## 2024-06-19 DIAGNOSIS — Z87.42 HISTORY OF OVARIAN CYST: ICD-10-CM

## 2024-06-19 DIAGNOSIS — R10.31 RIGHT LOWER QUADRANT PAIN: ICD-10-CM

## 2024-06-19 PROCEDURE — 76856 US EXAM PELVIC COMPLETE: CPT | Mod: GC | Performed by: RADIOLOGY

## 2024-06-19 PROCEDURE — 76830 TRANSVAGINAL US NON-OB: CPT | Mod: GC | Performed by: RADIOLOGY

## 2024-06-24 ENCOUNTER — TELEPHONE (OUTPATIENT)
Dept: FAMILY MEDICINE | Facility: CLINIC | Age: 29
End: 2024-06-24
Payer: COMMERCIAL

## 2024-06-24 NOTE — TELEPHONE ENCOUNTER
Dr. Cordoba,     Spoke to pt, and I double booked your schedule for virtual visit on Wednesday 6/26/24 at 10:20 slot.     Lois MELO RN  [facility]        ----- Message from Harrison Cordoba sent at 6/24/2024  4:41 PM CDT -----  I called patient back last Friday. No answer. Can try to set up a virtual visit or give me a time to call them on Wednesday.   Dr. Cordoba  ----- Message -----  From: Susan Roque RN  Sent: 6/20/2024   3:16 PM CDT  To: Harrison Cordoba MD    Patient just called back, went over message below and patient became tearful. Would like provider to call them back if possible, they will remain by their phone now.    JESSICA Mcelroy RN  [facility]

## 2024-06-26 ENCOUNTER — OFFICE VISIT (OUTPATIENT)
Dept: OBGYN | Facility: CLINIC | Age: 29
End: 2024-06-26
Payer: COMMERCIAL

## 2024-06-26 ENCOUNTER — VIRTUAL VISIT (OUTPATIENT)
Dept: FAMILY MEDICINE | Facility: CLINIC | Age: 29
End: 2024-06-26
Payer: COMMERCIAL

## 2024-06-26 VITALS
HEIGHT: 65 IN | HEART RATE: 64 BPM | SYSTOLIC BLOOD PRESSURE: 125 MMHG | BODY MASS INDEX: 22.63 KG/M2 | DIASTOLIC BLOOD PRESSURE: 80 MMHG

## 2024-06-26 DIAGNOSIS — R10.31 RIGHT LOWER QUADRANT PAIN: ICD-10-CM

## 2024-06-26 DIAGNOSIS — N80.129 ENDOMETRIOMA OF OVARY: Primary | ICD-10-CM

## 2024-06-26 DIAGNOSIS — N94.3 PRE-MENSTRUAL SYNDROME: Primary | ICD-10-CM

## 2024-06-26 DIAGNOSIS — N80.129 ENDOMETRIOMA OF OVARY: ICD-10-CM

## 2024-06-26 PROCEDURE — 99213 OFFICE O/P EST LOW 20 MIN: CPT | Mod: GE | Performed by: OBSTETRICS & GYNECOLOGY

## 2024-06-26 PROCEDURE — G2211 COMPLEX E/M VISIT ADD ON: HCPCS | Mod: 95 | Performed by: FAMILY MEDICINE

## 2024-06-26 PROCEDURE — G0463 HOSPITAL OUTPT CLINIC VISIT: HCPCS

## 2024-06-26 PROCEDURE — 99213 OFFICE O/P EST LOW 20 MIN: CPT | Mod: 95 | Performed by: FAMILY MEDICINE

## 2024-06-26 RX ORDER — GUANFACINE 1 MG/1
1 TABLET, EXTENDED RELEASE ORAL
COMMUNITY
Start: 2024-06-12

## 2024-06-26 RX ORDER — NORGESTIMATE AND ETHINYL ESTRADIOL 0.25-0.035
1 KIT ORAL DAILY
Qty: 30 TABLET | Refills: 5 | Status: SHIPPED | OUTPATIENT
Start: 2024-06-26

## 2024-06-26 ASSESSMENT — PAIN SCALES - GENERAL: PAINLEVEL: NO PAIN (0)

## 2024-06-26 NOTE — LETTER
6/26/2024       RE: Marck Fernandez  [address]  [facility] [address]     Dear Colleague,    Thank you for referring your patient, Marck Fernandez, to the [facility]. Please see a copy of my visit note below.    [facility]  Gynecology Clinic Follow Up Visit    Date: 6/26/2024    SUBJECTIVE     Marck Fernandez is a 28 year old G0 non-bianary person who presents today to discuss endometrioma.     Patient has a recent history notable for ruptured ovarian cyst managed with diagnostic laparoscopy, cauterization of right ovarian cyst bed, and evacuation of hemoperitoneum on/ 4/30/24 . They were seen in clinic on 5/15/24 for post operative follow up at which time they were doing well.  Shortly following this visit, patient reported an onset of sharp right lower quadrant pain that lasted for a few days.  For workup of this abdominal pain a pelvic ultrasound was obtained and was notable for a 1.5 cm right ovarian lesion likely representing endometrioma, and right ovarian follicle measuring 2.0 cm.  Ultrasound was otherwise negative.    Patient reports today abdominal pain has completely resolved.  Denies having any nausea, vomiting, vaginal bleeding, change in discharge, itching.  Patient reports today they would like to discuss possibility of endometriosis as a diagnosis.  Patient additionally expresses concern surrounding known diagnosis of PMDD.  They have previously tried several different SSRIs which they did not feel adequately managed their symptoms.  Patient describes having increased anxiety, feelings of insecurity, depression/mood swings in the days leading up to their menses, with all symptoms seemingly resolving by final day of their period.  They have questions about medications to manage ovulation, specifically OCPs.  Does describe previously trying other hormonal  contraceptive options including Depo, Nexplanon.  Would like to avoid Nexplanon or IUD.  Patient does report mild to moderate abdominal pain/cramping during menses, however denies dyschezia, painful intercourse, dysuria during or around the time of menses.  Does experience some bloating.      Gynecology History:  Patient's last menstrual period was 06/09/2024 (exact date).  Menses:   Q monthly. Last for approximately 6 days. Heaviest first 3 days  Contraception:   None currently.  Previously tried Depo-Provera, Nexplanon.  Experienced migration of Nexplanon and subsequent removal.  Would like to avoid implant.  Sexual Activity  Currently sexually active.  Denies recent sexual activity with sperm producing partners    OBJECTIVE     LMP 06/09/2024 (Exact Date)   Gen: Well-appearing, NAD.  Partner Sathya in the room.  HEENT: Normocephalic, atraumatic  CV:        Well perfused; no LE edema  Pulm:  Normal respiratory effort and rate  Abd: Soft, non-tender to palpation of all quadrants, non-distended.  No obvious masses palpated.  Recent laparoscopic incision sites visualized.  Clean, dry, intact.  Appear to be healing well.    Recent imaging:  US PELVIC TRANSABDOMINAL AND TRANSVAGINAL  Date: 6/19/2024 3:57 PM   IMPRESSION:   1.  Right ovarian homogenously echogenic lesion likely representing  endometrioma.  2.  Dominant right ovarian follicle measuring up to 2.0 cm.  3.  Physiologic free fluid in the pelvis.      ASSESSMENT & PLAN     Marck Fernandez is a non binary 28 year old G0 who presents today to discuss findings from their recent ultrasound.  Pelvic ultrasound as above obtained following acute, brief episode of abdominal pain.  Recent diagnostic laparoscopy in the setting of ruptured likely hemorrhagic cyst 5/2024.  Pelvic ultrasound report notable for 1.5x 1.7 cm hypoechoic right ovarian lesion suspicious for endometrioma.  Given patient's recent history of surgery, review of operative report noting cyst wall at the  time was unable to be completely excised, ultrasound report of right ovarian lesion less likely to be endometrioma, possibly more consistent with recent postoperative state/recent ruptured hemorrhagic cyst in same location.  This was reviewed with the patient at length.      # C/f Endometrioma  # Pre-menstural symptoms  -Low clinical suspicion at this time for endometriosis/concern for endometrioma.  Given ultrasound findings, it is reasonable consider repeat imaging in 3-6 months from last US or earlier if symptoms return.  Asked patient to call/send [employer] message should symptoms persist or return.  Will order pelvic ultrasound at that time.  -Did discuss with patient at length PMDD and desire to decrease monthly cyclical hormonal effects of menses.  Options were reviewed with patient including OCPs, Depo, Nexplanon, IUD.  Patient would like to try continuous OCPs to see if this will help symptoms.   - Ortho-cyclen sent to patient's pharmacy. Patient to trial this over the next 3 months.  Will plan to contact clinic if they would like to continue medication versus discuss alternative options.    Staffed with Dr. Milo Duque DO, MS  OBGYN Resident, PGY4  [facility]  06/26/2024 1:05 PM     The Patient was seen in Resident Continuity Clinic by Dr. Duque.   I reviewed the history, imaging & exam. Assessment and plan were jointly made.   Ariadne Pedraza MD, MPH

## 2024-06-26 NOTE — PROGRESS NOTES
Marck is a 28 year old who is being evaluated via a billable video visit.          Assessment & Plan     (N80.129) Endometrioma of ovary  (primary encounter diagnosis)  EHR reviewed.   Past medical history, problem list, past surgical history, family history, social history, medications reviewed, updated, reconciled.   Seen on 6/14/24 with pelvic pain. Imaging ordered and reviewed again with patient. We discussed the natural history of endometrioma if this is certainly the diagnosis.   Recommended visit with OB gyn to determine next steps whether monitoring, initiating birth control, surgical options. Marck mentions possible fertility plans in the future. They will discuss the best course of action which is a little complicated given symptoms and recent hemorrhagic cyst.   All questions and concerns addressed and Marck is reassured.         The longitudinal plan of care for the diagnosis(es)/condition(s) as documented were addressed during this visit. Due to the added complexity in care, I will continue to support Marck in the subsequent management and with ongoing continuity of care.         Subjective   Marck is a 28 year old, presenting for the following health issues:  Cyst      6/26/2024    10:14 AM   Additional Questions   Roomed by Tia     Video Start Time:  10:20    History of Present Illness       Reason for visit:  Ovarian pain    Marck eats 2-3 servings of fruits and vegetables daily.Marck consumes 1 sweetened beverage(s) daily.Marck exercises with enough effort to increase Marck's heart rate 30 to 60 minutes per day.  Marck exercises with enough effort to increase Marck's heart rate 3 or less days per week. Marck is missing 1 dose(s) of medications per week.             Marck is seen to discuss the recent results and message received by the RN.   They were a little worried about the advice given. Just wants to clarify what is the procedure and recommendations now. Already schedule with midwife.   No new symptoms.  "  No new pain or bleeding.     Past Medical History:   Diagnosis Date    Abnormal Pap smear of cervix     ADHD (attention deficit hyperactivity disorder)     Anxiety     Depression 11/20/2023    Eating disorder     Fibromyalgia 11/20/2023    Irritable bowel syndrome     Trichotillomania      Past Surgical History:   Procedure Laterality Date    LAPAROSCOPIC CYSTECTOMY OVARIAN (ONCOLOGY) Right 4/30/2024    Procedure: exploration Laparoscopy with evacuation of hemoperitoneum, cauterization of right ovarian cyst;  Surgeon: Jesusita Mckeon MD;  Location:  OR    top surgery/ double masectomy Bilateral      Family History   Problem Relation Age of Onset    Joint hypermobility Mother     Joint hypermobility Father     Spine Problems Father     Alzheimer Disease Maternal Grandmother     Coronary Artery Disease Maternal Grandfather            Objective    Vitals - Patient Reported  Weight (Patient Reported): 62.6 kg (138 lb)  Height (Patient Reported): 165.1 cm (5' 5\")  BMI (Based on Pt Reported Ht/Wt): 22.96        Physical Exam   GENERAL: alert and no distress  EYES: Eyes grossly normal to inspection.  No discharge or erythema, or obvious scleral/conjunctival abnormalities.  RESP: No audible wheeze, cough, or visible cyanosis.    SKIN: Visible skin clear. No significant rash, abnormal pigmentation or lesions.  NEURO: Cranial nerves grossly intact.  Mentation and speech appropriate for age.  PSYCH: Appropriate affect, tone, and pace of words          Video-Visit Details    Type of service:  Video Visit   Video End Time: 10:29  Originating Location (pt. Location): Home    Distant Location (provider location):  On-site  Platform used for Video Visit: Terese  Signed Electronically by: Harrison Cordoba MD    "

## 2024-06-26 NOTE — PROGRESS NOTES
[facility] [place]  Gynecology Clinic Follow Up Visit    Date: 6/26/2024    KIMBERLY Fernandez is a 28 year old G0 non-bianary person who presents today to discuss endometrioma.     Patient has a recent history notable for ruptured ovarian cyst managed with diagnostic laparoscopy, cauterization of right ovarian cyst bed, and evacuation of hemoperitoneum on/ 4/30/24 . They were seen in clinic on 5/15/24 for post operative follow up at which time they were doing well.  Shortly following this visit, patient reported an onset of sharp right lower quadrant pain that lasted for a few days.  For workup of this abdominal pain a pelvic ultrasound was obtained and was notable for a 1.5 cm right ovarian lesion likely representing endometrioma, and right ovarian follicle measuring 2.0 cm.  Ultrasound was otherwise negative.    Patient reports today abdominal pain has completely resolved.  Denies having any nausea, vomiting, vaginal bleeding, change in discharge, itching.  Patient reports today they would like to discuss possibility of endometriosis as a diagnosis.  Patient additionally expresses concern surrounding known diagnosis of PMDD.  They have previously tried several different SSRIs which they did not feel adequately managed their symptoms.  Patient describes having increased anxiety, feelings of insecurity, depression/mood swings in the days leading up to their menses, with all symptoms seemingly resolving by final day of their period.  They have questions about medications to manage ovulation, specifically OCPs.  Does describe previously trying other hormonal contraceptive options including Depo, Nexplanon.  Would like to avoid Nexplanon or IUD.  Patient does report mild to moderate abdominal pain/cramping during menses, however denies dyschezia, painful intercourse, dysuria during or around the time of menses.  Does experience some bloating.      Gynecology History:  Patient's  last menstrual period was 06/09/2024 (exact date).  Menses:   Q monthly. Last for approximately 6 days. Heaviest first 3 days  Contraception:   None currently.  Previously tried Depo-Provera, Nexplanon.  Experienced migration of Nexplanon and subsequent removal.  Would like to avoid implant.  Sexual Activity  Currently sexually active.  Denies recent sexual activity with sperm producing partners    OBJECTIVE     LMP 06/09/2024 (Exact Date)   Gen: Well-appearing, NAD.  Partner Sathya in the room.  HEENT: Normocephalic, atraumatic  CV:        Well perfused; no LE edema  Pulm:  Normal respiratory effort and rate  Abd: Soft, non-tender to palpation of all quadrants, non-distended.  No obvious masses palpated.  Recent laparoscopic incision sites visualized.  Clean, dry, intact.  Appear to be healing well.    Recent imaging:  US PELVIC TRANSABDOMINAL AND TRANSVAGINAL  Date: 6/19/2024 3:57 PM   IMPRESSION:   1.  Right ovarian homogenously echogenic lesion likely representing  endometrioma.  2.  Dominant right ovarian follicle measuring up to 2.0 cm.  3.  Physiologic free fluid in the pelvis.      ASSESSMENT & PLAN     Marck Fernnadez is a non binary 28 year old G0 who presents today to discuss findings from their recent ultrasound.  Pelvic ultrasound as above obtained following acute, brief episode of abdominal pain.  Recent diagnostic laparoscopy in the setting of ruptured likely hemorrhagic cyst 5/2024.  Pelvic ultrasound report notable for 1.5x 1.7 cm hypoechoic right ovarian lesion suspicious for endometrioma.  Given patient's recent history of surgery, review of operative report noting cyst wall at the time was unable to be completely excised, ultrasound report of right ovarian lesion less likely to be endometrioma, possibly more consistent with recent postoperative state/recent ruptured hemorrhagic cyst in same location.  This was reviewed with the patient at length.      # C/f Endometrioma  # Pre-menstural  symptoms  -Low clinical suspicion at this time for endometriosis/concern for endometrioma.  Given ultrasound findings, it is reasonable consider repeat imaging in 3-6 months from last US or earlier if symptoms return.  Asked patient to call/send "[employer] message should symptoms persist or return.  Will order pelvic ultrasound at that time.  -Did discuss with patient at length PMDD and desire to decrease monthly cyclical hormonal effects of menses.  Options were reviewed with patient including OCPs, Depo, Nexplanon, IUD.  Patient would like to try continuous OCPs to see if this will help symptoms.   - Ortho-cyclen sent to patient's pharmacy. Patient to trial this over the next 3 months.  Will plan to contact clinic if they would like to continue medication versus discuss alternative options.    Staffed with Dr. Milo Duque DO, MS  OBGYN Resident, PGY4  [facility]  06/26/2024 1:05 PM     The Patient was seen in Resident Continuity Clinic by Dr. Duque.   I reviewed the history, imaging & exam. Assessment and plan were jointly made.   Ariadne Pedraza MD, MPH

## 2024-06-26 NOTE — PATIENT INSTRUCTIONS
Thank you for trusting us with your care!     If you need to contact us for questions about:  Symptoms, Scheduling & Medical Questions; Non-urgent (2-3 day response) [facility] message, Urgent (needing response today) [phone] (if after 3:30pm next day response)   Prescriptions: Please call your Pharmacy   Billing: [facility] [phone] or [facility]:[phone]

## 2024-08-30 DIAGNOSIS — G89.29 CHRONIC PAIN OF LEFT ANKLE: ICD-10-CM

## 2024-08-30 DIAGNOSIS — M25.572 CHRONIC PAIN OF LEFT ANKLE: ICD-10-CM

## 2024-09-03 RX ORDER — MELOXICAM 15 MG/1
TABLET ORAL
Qty: 30 TABLET | Refills: 0 | Status: SHIPPED | OUTPATIENT
Start: 2024-09-03

## 2024-12-06 PROBLEM — F41.9 ANXIETY: Status: ACTIVE | Noted: 2024-12-06

## 2024-12-06 PROBLEM — F90.8 OTHER SPECIFIED ATTENTION DEFICIT HYPERACTIVITY DISORDER (ADHD): Status: ACTIVE | Noted: 2024-12-06

## 2024-12-22 ENCOUNTER — OFFICE VISIT (OUTPATIENT)
Dept: URGENT CARE | Facility: URGENT CARE | Age: 29
End: 2024-12-22
Payer: COMMERCIAL

## 2024-12-22 VITALS
BODY MASS INDEX: 24.96 KG/M2 | HEART RATE: 65 BPM | TEMPERATURE: 98.5 F | WEIGHT: 150 LBS | SYSTOLIC BLOOD PRESSURE: 142 MMHG | OXYGEN SATURATION: 100 % | DIASTOLIC BLOOD PRESSURE: 89 MMHG

## 2024-12-22 DIAGNOSIS — T30.0 BURN, FIRST DEGREE: Primary | ICD-10-CM

## 2024-12-22 PROCEDURE — 99213 OFFICE O/P EST LOW 20 MIN: CPT | Performed by: PHYSICIAN ASSISTANT

## 2024-12-22 NOTE — LETTER
December 22, 2024      Marck Fernandez  [address]   [facility] [address]        To Whom It May Concern:    Marck Fernandez  was seen on 12/22.  Please excuse Marck until 12/26 due to injury.        Sincerely,        Cruz Garcias PA-C
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
## 2025-01-08 ENCOUNTER — TELEPHONE (OUTPATIENT)
Dept: OBGYN | Facility: CLINIC | Age: 30
End: 2025-01-08
Payer: COMMERCIAL

## 2025-01-08 DIAGNOSIS — Z87.42 HISTORY OF OVARIAN CYST: ICD-10-CM

## 2025-01-08 DIAGNOSIS — R10.31 RIGHT LOWER QUADRANT PAIN: Primary | ICD-10-CM

## 2025-01-08 NOTE — TELEPHONE ENCOUNTER
"history notable for ruptured ovarian cyst managed with diagnostic laparoscopy, cauterization of right ovarian cyst bed, and evacuation of hemoperitoneum on/ 4/30/2024.    Calling in today with Right sided pain in the area of surgery. Last week was generalized pain, now more localized. Hurts intermittently when taking a deep breath. Anywhere from a 3-4/10, not constant. Marck describes pain as Dull and achy, moments of sharp. Has not tried any pain medication. Was told to call in if this happens.     Per note from [facility] on 6/26/24 \"low clinical suspicion at this time for endometriosis/concern for endometrioma.  Given ultrasound findings, it is reasonable consider repeat imaging in 3-6 months from last US or earlier if symptoms return.  Asked patient to call/send [employer] message should symptoms persist or return.  Will order pelvic ultrasound at that time.\"  US order placed, called Marck and let her know. She is agreeable with this plan.   "

## 2025-01-16 ENCOUNTER — ANCILLARY PROCEDURE (OUTPATIENT)
Dept: ULTRASOUND IMAGING | Facility: CLINIC | Age: 30
End: 2025-01-16
Payer: COMMERCIAL

## 2025-01-16 DIAGNOSIS — Z87.42 HISTORY OF OVARIAN CYST: ICD-10-CM

## 2025-01-16 DIAGNOSIS — R10.31 RIGHT LOWER QUADRANT PAIN: ICD-10-CM

## 2025-01-16 PROCEDURE — 76856 US EXAM PELVIC COMPLETE: CPT

## 2025-01-16 PROCEDURE — 76830 TRANSVAGINAL US NON-OB: CPT | Mod: 26 | Performed by: STUDENT IN AN ORGANIZED HEALTH CARE EDUCATION/TRAINING PROGRAM

## 2025-01-16 PROCEDURE — 76830 TRANSVAGINAL US NON-OB: CPT

## 2025-01-16 PROCEDURE — 76856 US EXAM PELVIC COMPLETE: CPT | Mod: 26 | Performed by: STUDENT IN AN ORGANIZED HEALTH CARE EDUCATION/TRAINING PROGRAM

## 2025-02-23 ENCOUNTER — MYC MEDICAL ADVICE (OUTPATIENT)
Dept: FAMILY MEDICINE | Facility: CLINIC | Age: 30
End: 2025-02-23
Payer: COMMERCIAL

## 2025-02-25 NOTE — TELEPHONE ENCOUNTER
"Dr. Boyer/Dr. Cordoba - Patient requesting thyroid labs, has appointment to discuss with Dr. Cordoba 3/19/25. Do you recommend patient keep appointment to discuss or can patient \"just do the blood work\"? Lab appt prior to 3/19 OV so results can be discussed?    Patient concerns: unexplained weight gain, dry skin, fatigue, HR averaging 55-56, elevated BP, cold feet, worsening depression.    TSH   Date Value Ref Range Status   11/20/2023 0.63 0.30 - 4.20 uIU/mL Final      Routing to PCP as well as Dr. Cordoba due to upcoming appt.    Ericka Begum RN  [facility]      "

## 2025-03-03 ENCOUNTER — OFFICE VISIT (OUTPATIENT)
Dept: URGENT CARE | Facility: URGENT CARE | Age: 30
End: 2025-03-03
Payer: COMMERCIAL

## 2025-03-03 VITALS
WEIGHT: 157 LBS | BODY MASS INDEX: 26.13 KG/M2 | SYSTOLIC BLOOD PRESSURE: 114 MMHG | DIASTOLIC BLOOD PRESSURE: 75 MMHG | TEMPERATURE: 98.9 F | HEART RATE: 80 BPM | OXYGEN SATURATION: 98 %

## 2025-03-03 DIAGNOSIS — L03.011 PARONYCHIA OF FINGER OF RIGHT HAND: Primary | ICD-10-CM

## 2025-03-03 PROCEDURE — 99203 OFFICE O/P NEW LOW 30 MIN: CPT | Performed by: PHYSICIAN ASSISTANT

## 2025-03-03 PROCEDURE — 3078F DIAST BP <80 MM HG: CPT | Performed by: PHYSICIAN ASSISTANT

## 2025-03-03 PROCEDURE — 3074F SYST BP LT 130 MM HG: CPT | Performed by: PHYSICIAN ASSISTANT

## 2025-03-03 RX ORDER — CEPHALEXIN 500 MG/1
500 CAPSULE ORAL 4 TIMES DAILY
Qty: 28 CAPSULE | Refills: 0 | Status: SHIPPED | OUTPATIENT
Start: 2025-03-03 | End: 2025-03-10

## 2025-03-03 NOTE — PROGRESS NOTES
SUBJECTIVE:   Marck Fernandez is a 29 year old adult presenting with a chief complaint of   Chief Complaint   Patient presents with    Toe Pain     Right great toe, happened during Pedicure 2 days ago, redness and swelling       Marck is an established patient of [facility].  Patient presents with right great toe pain following pedicare.  States discharge yesterday.  Painful.  No fevers.  No hx of MRSA.    Treatment:  topical abx.          Review of Systems    Past Medical History:   Diagnosis Date    Abnormal Pap smear of cervix     ADHD (attention deficit hyperactivity disorder)     Anxiety     Depression 11/20/2023    Eating disorder     Fibromyalgia 11/20/2023    Irritable bowel syndrome     Trichotillomania      Family History   Problem Relation Age of Onset    Joint hypermobility Mother     Joint hypermobility Father     Spine Problems Father     Alzheimer Disease Maternal Grandmother     Coronary Artery Disease Maternal Grandfather      Current Outpatient Medications   Medication Sig Dispense Refill    cephALEXin (KEFLEX) 500 MG capsule Take 1 capsule (500 mg) by mouth 4 times daily for 7 days. 28 capsule 0    gabapentin (NEURONTIN) 300 MG capsule Take 1 capsule (300 mg) by mouth 3 times daily 120 capsule 3    guanFACINE (INTUNIV) 1 MG TB24 24 hr tablet Take 2 mg by mouth daily at 2 pm.      LORazepam (ATIVAN) 0.5 MG tablet TAKE 1 TABLET BY MOUTH ONCE A DAY AS NEEDED FOR INCREASED ANXIETY OR PANIC ATTACK      meloxicam (MOBIC) 15 MG tablet TAKE 1 TABLET(15 MG) BY MOUTH DAILY AS NEEDED FOR MODERATE PAIN OR PAIN 30 tablet 0    methocarbamol (ROBAXIN) 500 MG tablet Take 500 mg by mouth 3 times daily as needed      ondansetron (ZOFRAN ODT) 4 MG ODT tab Take 1 tablet (4 mg) by mouth every 8 hours as needed for nausea 90 tablet 1    QUEtiapine (SEROQUEL) 200 MG tablet Take 1 tablet (200 mg) by mouth at bedtime Pt takes half of a pill 90 tablet 1    acetaminophen (TYLENOL) 325 MG tablet Take 3 tablets (975 mg) by mouth  every 6 hours as needed for mild pain (Patient not taking: Reported on 3/3/2025) 50 tablet 0    ferrous sulfate (FEROSUL) 325 (65 Fe) MG tablet Take 1 tablet (325 mg) by mouth every other day (Patient not taking: Reported on 12/6/2024) 60 tablet 0    norgestimate-ethinyl estradiol (ORTHO-CYCLEN) 0.25-35 MG-MCG tablet Take 1 tablet by mouth daily (Patient not taking: Reported on 12/6/2024) 30 tablet 5    QUEtiapine (SEROQUEL) 100 MG tablet Take 150 mg by mouth every other day (Patient not taking: Reported on 3/3/2025)       Social History     Tobacco Use    Smoking status: Never     Passive exposure: Never    Smokeless tobacco: Never   Substance Use Topics    Alcohol use: Never       OBJECTIVE  /75 (BP Location: Right arm, Patient Position: Sitting, Cuff Size: Adult Large)   Pulse 80   Temp 98.9  F (37.2  C) (Tympanic)   Wt 71.2 kg (157 lb)   LMP 02/28/2025 (Exact Date)   SpO2 98%   BMI 26.13 kg/m      Physical Exam  Vitals reviewed.   Constitutional:       Appearance: Normal appearance. Marck is normal weight.   Cardiovascular:      Rate and Rhythm: Normal rate.   Skin:     Comments: Right great toe with erythema around nail, mild edema.  No fluid collection   Neurological:      General: No focal deficit present.      Mental Status: Marck is alert.         Labs:  No results found for this or any previous visit (from the past 24 hours).    ASSESSMENT:      ICD-10-CM    1. Paronychia of finger of right hand  L03.011 cephALEXin (KEFLEX) 500 MG capsule           Medical Decision Making:    Differential Diagnosis:  Paronychia, abscess, irritation    Serious Comorbid Conditions:  Adult:   reviewed    PLAN:    Rx for keflex if symptoms worsen.  For now, warm soaks and topical abx.      Followup:    If not improving or if condition worsens, follow up with your Primary Care Provider, If not improving or if conditions worsens over the next 12-24 hours, go to the Emergency Department    There are no Patient  Instructions on file for this visit.

## 2025-03-12 ENCOUNTER — OFFICE VISIT (OUTPATIENT)
Dept: URGENT CARE | Facility: URGENT CARE | Age: 30
End: 2025-03-12
Payer: COMMERCIAL

## 2025-03-12 VITALS
SYSTOLIC BLOOD PRESSURE: 122 MMHG | WEIGHT: 156 LBS | DIASTOLIC BLOOD PRESSURE: 81 MMHG | OXYGEN SATURATION: 100 % | BODY MASS INDEX: 25.96 KG/M2 | HEART RATE: 55 BPM | RESPIRATION RATE: 18 BRPM | TEMPERATURE: 97.4 F

## 2025-03-12 DIAGNOSIS — H92.01 OTALGIA, RIGHT: Primary | ICD-10-CM

## 2025-03-12 DIAGNOSIS — H69.91 DYSFUNCTION OF RIGHT EUSTACHIAN TUBE: ICD-10-CM

## 2025-03-12 PROCEDURE — 3079F DIAST BP 80-89 MM HG: CPT | Performed by: FAMILY MEDICINE

## 2025-03-12 PROCEDURE — 3074F SYST BP LT 130 MM HG: CPT | Performed by: FAMILY MEDICINE

## 2025-03-12 PROCEDURE — 99213 OFFICE O/P EST LOW 20 MIN: CPT | Performed by: FAMILY MEDICINE

## 2025-03-12 RX ORDER — BUPROPION HYDROCHLORIDE 150 MG/1
150 TABLET ORAL EVERY MORNING
COMMUNITY
Start: 2025-03-04

## 2025-03-12 RX ORDER — FLUTICASONE PROPIONATE 50 MCG
1 SPRAY, SUSPENSION (ML) NASAL DAILY
Qty: 16 G | Refills: 0 | Status: SHIPPED | OUTPATIENT
Start: 2025-03-12

## 2025-03-12 NOTE — PATIENT INSTRUCTIONS
Okay to take ibuprofen 200 mg - 4 tablets (800 mg) every 8 hours as needed.  Okay to take tylenol 500 mg - 2 tablets (1000 mg) every 6-8 hours as needed, do not exceed 3000 mg in 24 hours.    Use flonase to help vent eustachian tube  Take sudafed 60 mg every 6 hours as needed to help vent eustachian tube

## 2025-03-12 NOTE — LETTER
March 12, 2025      Marck Fernandez  [address]   [facility] [place] [address]        To Whom It May Concern:    Marck Fernandez  was seen on 3/12.  Please excuse Marck from work due to illness.        Sincerely,        Willy Tubbs MD    Electronically signed

## 2025-03-12 NOTE — PROGRESS NOTES
SUBJECTIVE:  Chief Complaint   Patient presents with    Urgent Care     Pt did a cold plunge on Saturday and got water in ear, right earache started then. Used OTC ear drops didn't help.      Marck Fernandez is a 29 year old adult who presents with a chief complaint of right ear pain.    Did cold plunge on Saturday, went backwards and thinks that got water in her ear.  Has been using OTC ear drops without improvement.  Took tylenol    No fever  Worse pain with yawning, pain goes down neck    Past Medical History:   Diagnosis Date    Abnormal Pap smear of cervix     ADHD (attention deficit hyperactivity disorder)     Anxiety     Depression 11/20/2023    Eating disorder     Fibromyalgia 11/20/2023    Irritable bowel syndrome     Trichotillomania      Current Outpatient Medications   Medication Sig Dispense Refill    buPROPion (WELLBUTRIN XL) 150 MG 24 hr tablet Take 150 mg by mouth every morning.      guanFACINE (INTUNIV) 1 MG TB24 24 hr tablet Take 2 mg by mouth daily at 2 pm.      meloxicam (MOBIC) 15 MG tablet TAKE 1 TABLET(15 MG) BY MOUTH DAILY AS NEEDED FOR MODERATE PAIN OR PAIN 30 tablet 0    methocarbamol (ROBAXIN) 500 MG tablet Take 500 mg by mouth 3 times daily as needed      QUEtiapine (SEROQUEL) 200 MG tablet Take 1 tablet (200 mg) by mouth at bedtime Pt takes half of a pill 90 tablet 1    acetaminophen (TYLENOL) 325 MG tablet Take 3 tablets (975 mg) by mouth every 6 hours as needed for mild pain (Patient not taking: Reported on 3/3/2025) 50 tablet 0    ferrous sulfate (FEROSUL) 325 (65 Fe) MG tablet Take 1 tablet (325 mg) by mouth every other day (Patient not taking: Reported on 3/12/2025) 60 tablet 0    gabapentin (NEURONTIN) 300 MG capsule Take 1 capsule (300 mg) by mouth 3 times daily (Patient not taking: Reported on 3/12/2025) 120 capsule 3    LORazepam (ATIVAN) 0.5 MG tablet TAKE 1 TABLET BY MOUTH ONCE A DAY AS NEEDED FOR INCREASED ANXIETY OR PANIC ATTACK      norgestimate-ethinyl estradiol  (ORTHO-CYCLEN) 0.25-35 MG-MCG tablet Take 1 tablet by mouth daily (Patient not taking: Reported on 3/12/2025) 30 tablet 5    ondansetron (ZOFRAN ODT) 4 MG ODT tab Take 1 tablet (4 mg) by mouth every 8 hours as needed for nausea (Patient not taking: Reported on 3/12/2025) 90 tablet 1    QUEtiapine (SEROQUEL) 100 MG tablet Take 150 mg by mouth every other day (Patient not taking: Reported on 12/22/2024)       Social History     Tobacco Use    Smoking status: Never     Passive exposure: Never    Smokeless tobacco: Never   Substance Use Topics    Alcohol use: Never       ROS:  Review of systems negative except as stated above.    EXAM:   /81 (BP Location: Right arm, Patient Position: Sitting, Cuff Size: Adult Regular)   Pulse 55   Temp 97.4  F (36.3  C) (Tympanic)   Resp 18   Wt 70.8 kg (156 lb)   LMP 02/28/2025 (Exact Date)   SpO2 100%   BMI 25.96 kg/m    GENERAL APPEARANCE: healthy, alert and no distress  EARS: bilateral ear canals and TMs normal  PSYCH:alert, affect bright      ASSESSMENT/PLAN:  (H92.01) Otalgia, right  (primary encounter diagnosis)  Comment: eustachian tube dysfunction  Plan: tylenol, ibuprofen    (H69.91) Dysfunction of right eustachian tube  Plan: fluticasone (FLONASE) 50 MCG/ACT nasal spray        Sudafed      Reassurance given that does not have acute ear infection, okay to stop ear drops.  Encourage tylenol and ibuprofen for pain.  Reviewed eustachian tube dysfunction and treatment with sudafed and RX flonase.    Work note given  Follow up with primary provider if no improvement in 2 weeks    Willy Tubbs MD  March 12, 2025 1:37 PM

## 2025-03-30 ENCOUNTER — OFFICE VISIT (OUTPATIENT)
Dept: URGENT CARE | Facility: URGENT CARE | Age: 30
End: 2025-03-30
Payer: COMMERCIAL

## 2025-03-30 VITALS
BODY MASS INDEX: 25.87 KG/M2 | RESPIRATION RATE: 16 BRPM | TEMPERATURE: 97.7 F | WEIGHT: 155.44 LBS | OXYGEN SATURATION: 99 % | HEART RATE: 74 BPM | SYSTOLIC BLOOD PRESSURE: 125 MMHG | DIASTOLIC BLOOD PRESSURE: 86 MMHG

## 2025-03-30 DIAGNOSIS — J02.9 ACUTE PHARYNGITIS, UNSPECIFIED ETIOLOGY: Primary | ICD-10-CM

## 2025-03-30 PROCEDURE — 36415 COLL VENOUS BLD VENIPUNCTURE: CPT

## 2025-03-30 PROCEDURE — 3074F SYST BP LT 130 MM HG: CPT

## 2025-03-30 PROCEDURE — 3079F DIAST BP 80-89 MM HG: CPT

## 2025-03-30 PROCEDURE — 99213 OFFICE O/P EST LOW 20 MIN: CPT

## 2025-03-30 PROCEDURE — 86665 EPSTEIN-BARR CAPSID VCA: CPT

## 2025-03-30 PROCEDURE — 1125F AMNT PAIN NOTED PAIN PRSNT: CPT

## 2025-03-30 RX ORDER — AZITHROMYCIN 250 MG/1
TABLET, FILM COATED ORAL
Qty: 6 TABLET | Refills: 0 | Status: SHIPPED | OUTPATIENT
Start: 2025-03-30 | End: 2025-04-04

## 2025-03-30 ASSESSMENT — PAIN SCALES - GENERAL: PAINLEVEL_OUTOF10: SEVERE PAIN (7)

## 2025-03-30 NOTE — PROGRESS NOTES
Assessment & Plan     Acute pharyngitis, unspecified etiology  We discussed that the exam and history are consistent with likely strep pharyngitis.  Patient has had some GI upset with current Augmentin treatment, will switch to azithromycin.  Trial Chloraseptic topical spray as needed for sore throat.  We discussed etiology of mononucleosis and EBV, patient prefers to be tested for this today.  Labs obtained and pending at time of discharge.  - azithromycin (ZITHROMAX) 250 MG tablet; Take 2 tablets (500 mg) by mouth daily for 1 day, THEN 1 tablet (250 mg) daily for 4 days.  - phenol (CHLORASEPTIC) 1.4 % spray; Take 1 spray (1 mL) by mouth every hour as needed for sore throat.  - EBV Capsid Antibody IgM; Future  - EBV Capsid Antibody IgM    Follow up prn    Subjective   Marck is a 29 year old, presenting for the following health issues:  Urgent Care and Pharyngitis (Was treated Friday for strep-not feeling better)      3/30/2025     1:25 PM   Additional Questions   Roomed by lidia TAO      Patient reports that they initially developed nausea about 1 week ago, since then she has developed fatigue, sore throat. They were seen at outside urgent care on Friday and has been treated with augmentin for the past 2-3 days. They state sore throat has not been improved and has actually possible been worse. Also has had some GI upset from antibiotic.      Objective    /86   Pulse 74   Temp 97.7  F (36.5  C) (Temporal)   Resp 16   Wt 70.5 kg (155 lb 7 oz)   LMP 03/29/2025 (Exact Date)   SpO2 99%   BMI 25.87 kg/m    Body mass index is 25.87 kg/m .  Physical Exam  Constitutional:       General: Marck is not in acute distress.     Appearance: Normal appearance. Marck is not ill-appearing.   HENT:      Mouth/Throat:      Pharynx: Oropharyngeal exudate present.   Cardiovascular:      Rate and Rhythm: Normal rate and regular rhythm.   Pulmonary:      Effort: Pulmonary effort is normal. No respiratory distress.       Breath sounds: Normal breath sounds. No wheezing.   Skin:     General: Skin is warm and dry.   Neurological:      General: No focal deficit present.      Mental Status: Marck is alert and oriented to person, place, and time.   Psychiatric:         Mood and Affect: Mood normal.         Behavior: Behavior normal.                    Signed Electronically by: Sathya Manning DO

## 2025-03-30 NOTE — PROGRESS NOTES
Urgent Care Clinic Visit    Chief Complaint   Patient presents with    Urgent Care    Pharyngitis     Was treated Friday for strep-not feeling better               3/30/2025     1:25 PM   Additional Questions   Roomed by lidia     No  Does the patient have a sore throat and either history of fever >100.4 in the previous 24 hours without a cough or recent exposure to a known case of strep throat? Yes       States taking Amoxicillin-pot clavulate 500-125mg

## 2025-03-30 NOTE — LETTER
March 30, 2025      Marck Fernandez  [address]   [facility] [address]        To Whom It May Concern:    Marck Fernandez  was seen on 3/30/2025.  Please excuse Marck from work 3/31/2025-4/1/2025 due to illness.        Sincerely,        Sathya Manning, DO    Electronically signed

## 2025-04-02 LAB
EBV VCA IGM SER IA-ACNC: 12.2 U/ML
EBV VCA IGM SER IA-ACNC: NORMAL

## 2025-05-20 ENCOUNTER — OFFICE VISIT (OUTPATIENT)
Dept: URGENT CARE | Facility: URGENT CARE | Age: 30
End: 2025-05-20
Payer: COMMERCIAL

## 2025-05-20 VITALS
WEIGHT: 156 LBS | RESPIRATION RATE: 18 BRPM | OXYGEN SATURATION: 97 % | BODY MASS INDEX: 25.96 KG/M2 | TEMPERATURE: 97.2 F | DIASTOLIC BLOOD PRESSURE: 77 MMHG | SYSTOLIC BLOOD PRESSURE: 129 MMHG | HEART RATE: 115 BPM

## 2025-05-20 DIAGNOSIS — R07.0 THROAT PAIN: Primary | ICD-10-CM

## 2025-05-20 DIAGNOSIS — J03.01 ACUTE RECURRENT STREPTOCOCCAL TONSILLITIS: ICD-10-CM

## 2025-05-20 LAB — DEPRECATED S PYO AG THROAT QL EIA: POSITIVE

## 2025-05-20 PROCEDURE — 87880 STREP A ASSAY W/OPTIC: CPT | Performed by: NURSE PRACTITIONER

## 2025-05-20 PROCEDURE — 99213 OFFICE O/P EST LOW 20 MIN: CPT | Performed by: NURSE PRACTITIONER

## 2025-05-20 PROCEDURE — 3074F SYST BP LT 130 MM HG: CPT | Performed by: NURSE PRACTITIONER

## 2025-05-20 PROCEDURE — 3078F DIAST BP <80 MM HG: CPT | Performed by: NURSE PRACTITIONER

## 2025-05-20 RX ORDER — AZITHROMYCIN 250 MG/1
TABLET, FILM COATED ORAL
Qty: 6 TABLET | Refills: 0 | Status: SHIPPED | OUTPATIENT
Start: 2025-05-20 | End: 2025-05-20

## 2025-05-20 RX ORDER — PENICILLIN V POTASSIUM 500 MG/1
500 TABLET, FILM COATED ORAL 2 TIMES DAILY
Qty: 20 TABLET | Refills: 0 | Status: SHIPPED | OUTPATIENT
Start: 2025-05-20 | End: 2025-05-30

## 2025-05-20 NOTE — PROGRESS NOTES
Urgent Care Clinic Visit    Chief Complaint   Patient presents with    Urgent Care     - Started Last Night  - Throat Pain  - Swollen Lymph  - See White and Red Dots on throat  - Body Aches  - Headaches  - Nausea  - Recent strep 2 months ago               5/20/2025    10:22 AM   Additional Questions   Roomed by Nitin PARRA   Accompanied by Partner - William     No  Does the patient have a sore throat and either history of fever >100.4 in the previous 24 hours without a cough or recent exposure to a known case of strep throat? No

## 2025-05-20 NOTE — PROGRESS NOTES
"Assessment & Plan      Diagnosis Comments   1. Throat pain  Streptococcus A Rapid Screen w/Reflex to PCR - Clinic Collect       2. Acute recurrent streptococcal tonsillitis  penicillin V (VEETID) 500 MG tablet Drink plenty of fluids and rest.  May use salt water gargles- about 8 oz warm water with about 1 teaspoon salt  Sucrets and Cepacol spray are over the counter medications that numb the throat.  Over the counter pain relievers such as tylenol or ibuprofen may be used as needed.   Honey lemon tea helps to soothe the throat. \"Throat Coat\" tea is soothing as well.  Change toothbrush after 24 hours of antibiotics (may soak in 3-6% hydrogen peroxide)  Will be contagious for 24 hours after starting antibiotic  May return to school//work/activities 24 hours after antibiotics are started.  Wash hands frequently and do not share beverages.  Please follow up with primary care provider if symptoms are not improving, worsening or new symptoms or for any adverse reactions to medications.               SHARI Siddiqui [facility] [place]    Nabila Acharya is a 29 year old adult who presents to clinic today for the following health issues:  Chief Complaint   Patient presents with    Urgent Care     - Started Last Night  - Throat Pain  - Swollen Lymph  - See White and Red Dots on throat  - Body Aches  - Headaches  - Nausea  - Recent strep 2 months ago         5/20/2025    10:22 AM   Additional Questions   Roomed by Nitin PARRA   Accompanied by Partner - William TAO      URI Adult    Onset of symptoms was 3 day(s) ago.  Course of illness is worsening.    Severity moderate  Current and Associated symptoms: chills and sore throat  Treatment measures tried include Tylenol/Ibuprofen, Fluids, and Rest.  Predisposing factors include None.  Recurrent strep had 2 mos ago    Review of Systems  Constitutional, HEENT, cardiovascular, pulmonary, gi and gu systems are negative, except as " otherwise noted.      Objective    /77   Pulse 115   Temp 97.2  F (36.2  C) (Temporal)   Resp 18   Wt 70.8 kg (156 lb)   LMP 03/29/2025 (Exact Date)   SpO2 97%   BMI 25.96 kg/m    Physical Exam   GENERAL: alert and no distress  EYES: Eyes grossly normal to inspection, PERRL and conjunctivae and sclerae normal  HENT: normal cephalic/atraumatic, ear canals and TM's normal, nose and mouth without ulcers or lesions, oropharynx clear, oral mucous membranes moist, tonsillar hypertrophy, tonsillar erythema, and tonsillar exudate  NECK: bilateral anterior cervical adenopathy, no asymmetry, masses, or scars, and thyroid normal to palpation  RESP: lungs clear to auscultation - no rales, rhonchi or wheezes  CV: regular rate and rhythm, normal S1 S2, no S3 or S4, no murmur, click or rub, no peripheral edema  ABDOMEN: soft, nontender, no hepatosplenomegaly, no masses and bowel sounds normal  MS: no gross musculoskeletal defects noted, no edema    Results for orders placed or performed in visit on 05/20/25   Streptococcus A Rapid Screen w/Reflex to PCR - Clinic Collect     Status: Abnormal    Specimen: Throat; Swab   Result Value Ref Range    Group A Strep antigen Positive (A) Negative

## 2025-07-10 DIAGNOSIS — R30.0 DYSURIA: ICD-10-CM

## 2025-07-14 RX ORDER — NITROFURANTOIN 25; 75 MG/1; MG/1
CAPSULE ORAL
Qty: 10 CAPSULE | Refills: 0 | Status: SHIPPED | OUTPATIENT
Start: 2025-07-14

## 2025-07-22 ENCOUNTER — OFFICE VISIT (OUTPATIENT)
Dept: FAMILY MEDICINE | Facility: CLINIC | Age: 30
End: 2025-07-22
Payer: COMMERCIAL

## 2025-07-22 ENCOUNTER — RESULTS FOLLOW-UP (OUTPATIENT)
Dept: FAMILY MEDICINE | Facility: CLINIC | Age: 30
End: 2025-07-22

## 2025-07-22 VITALS
HEIGHT: 65 IN | WEIGHT: 160.9 LBS | OXYGEN SATURATION: 100 % | BODY MASS INDEX: 26.81 KG/M2 | HEART RATE: 68 BPM | RESPIRATION RATE: 18 BRPM | SYSTOLIC BLOOD PRESSURE: 117 MMHG | TEMPERATURE: 97.2 F | DIASTOLIC BLOOD PRESSURE: 78 MMHG

## 2025-07-22 DIAGNOSIS — B96.89 BACTERIAL VAGINITIS: Primary | ICD-10-CM

## 2025-07-22 DIAGNOSIS — R10.2 PELVIC PAIN: Primary | ICD-10-CM

## 2025-07-22 DIAGNOSIS — Z11.3 SCREENING EXAMINATION FOR VENEREAL DISEASE: ICD-10-CM

## 2025-07-22 DIAGNOSIS — N76.0 BACTERIAL VAGINITIS: Primary | ICD-10-CM

## 2025-07-22 DIAGNOSIS — N83.209 RUPTURED OVARIAN CYST: ICD-10-CM

## 2025-07-22 DIAGNOSIS — N76.0 BACTERIAL VAGINOSIS: ICD-10-CM

## 2025-07-22 DIAGNOSIS — M79.7 FIBROMYALGIA: ICD-10-CM

## 2025-07-22 DIAGNOSIS — R63.5 WEIGHT GAIN: ICD-10-CM

## 2025-07-22 DIAGNOSIS — B96.89 BACTERIAL VAGINOSIS: ICD-10-CM

## 2025-07-22 DIAGNOSIS — Z23 ENCOUNTER FOR IMMUNIZATION: ICD-10-CM

## 2025-07-22 LAB
ALBUMIN UR-MCNC: NEGATIVE MG/DL
ANION GAP SERPL CALCULATED.3IONS-SCNC: 11 MMOL/L (ref 7–15)
APPEARANCE UR: CLEAR
BILIRUB UR QL STRIP: NEGATIVE
BUN SERPL-MCNC: 15.3 MG/DL (ref 6–20)
CALCIUM SERPL-MCNC: 9.6 MG/DL (ref 8.8–10.4)
CHLORIDE SERPL-SCNC: 104 MMOL/L (ref 98–107)
CLUE CELLS: PRESENT
COLOR UR AUTO: YELLOW
CREAT SERPL-MCNC: 0.96 MG/DL (ref 0.51–1.17)
EGFRCR SERPLBLD CKD-EPI 2021: 82 ML/MIN/1.73M2
GLUCOSE SERPL-MCNC: 64 MG/DL (ref 70–99)
GLUCOSE UR STRIP-MCNC: NEGATIVE MG/DL
HCG UR QL: NEGATIVE
HCO3 SERPL-SCNC: 25 MMOL/L (ref 22–29)
HGB UR QL STRIP: NEGATIVE
HIV 1+2 AB+HIV1 P24 AG SERPL QL IA: NONREACTIVE
KETONES UR STRIP-MCNC: NEGATIVE MG/DL
LEUKOCYTE ESTERASE UR QL STRIP: NEGATIVE
NITRATE UR QL: NEGATIVE
PH UR STRIP: 6 [PH] (ref 5–7)
POTASSIUM SERPL-SCNC: 4 MMOL/L (ref 3.4–5.3)
SODIUM SERPL-SCNC: 140 MMOL/L (ref 135–145)
SP GR UR STRIP: 1.02 (ref 1–1.03)
T PALLIDUM AB SER QL: NONREACTIVE
TRICHOMONAS, WET PREP: ABNORMAL
TSH SERPL DL<=0.005 MIU/L-ACNC: 1.48 UIU/ML (ref 0.3–4.2)
UROBILINOGEN UR STRIP-ACNC: 0.2 E.U./DL
WBC'S/HIGH POWER FIELD, WET PREP: ABNORMAL
YEAST, WET PREP: ABNORMAL

## 2025-07-22 PROCEDURE — 86780 TREPONEMA PALLIDUM: CPT

## 2025-07-22 PROCEDURE — 99214 OFFICE O/P EST MOD 30 MIN: CPT | Mod: 25

## 2025-07-22 PROCEDURE — 84443 ASSAY THYROID STIM HORMONE: CPT

## 2025-07-22 PROCEDURE — 81025 URINE PREGNANCY TEST: CPT

## 2025-07-22 PROCEDURE — 87210 SMEAR WET MOUNT SALINE/INK: CPT

## 2025-07-22 PROCEDURE — 87389 HIV-1 AG W/HIV-1&-2 AB AG IA: CPT

## 2025-07-22 PROCEDURE — 80048 BASIC METABOLIC PNL TOTAL CA: CPT

## 2025-07-22 PROCEDURE — 87491 CHLMYD TRACH DNA AMP PROBE: CPT

## 2025-07-22 PROCEDURE — 87591 N.GONORRHOEAE DNA AMP PROB: CPT

## 2025-07-22 PROCEDURE — 81003 URINALYSIS AUTO W/O SCOPE: CPT

## 2025-07-22 PROCEDURE — 90471 IMMUNIZATION ADMIN: CPT

## 2025-07-22 PROCEDURE — G2211 COMPLEX E/M VISIT ADD ON: HCPCS

## 2025-07-22 PROCEDURE — 36415 COLL VENOUS BLD VENIPUNCTURE: CPT

## 2025-07-22 PROCEDURE — 90715 TDAP VACCINE 7 YRS/> IM: CPT

## 2025-07-22 RX ORDER — MELOXICAM 15 MG/1
15 TABLET ORAL DAILY PRN
Qty: 30 TABLET | Refills: 0 | Status: SHIPPED | OUTPATIENT
Start: 2025-07-22

## 2025-07-22 RX ORDER — METRONIDAZOLE 500 MG/1
500 TABLET ORAL 2 TIMES DAILY
Qty: 14 TABLET | Refills: 0 | Status: SHIPPED | OUTPATIENT
Start: 2025-07-22 | End: 2025-07-29

## 2025-07-22 ASSESSMENT — PAIN SCALES - GENERAL: PAINLEVEL_OUTOF10: NO PAIN (0)

## 2025-07-22 NOTE — NURSING NOTE
Prior to immunization administration, verified patients identity using patient s name and date of birth. Please see Immunization Activity for additional information.     Screening Questionnaire for Adult Immunization    Are you sick today?   No   Do you have allergies to medications, food, a vaccine component or latex?   No   Have you ever had a serious reaction after receiving a vaccination?   No   Do you have a long-term health problem with heart, lung, kidney, or metabolic disease (e.g., diabetes), asthma, a blood disorder, no spleen, complement component deficiency, a cochlear implant, or a spinal fluid leak?  Are you on long-term aspirin therapy?   No   Do you have cancer, leukemia, HIV/AIDS, or any other immune system problem?   No   Do you have a parent, brother, or sister with an immune system problem?   No   In the past 3 months, have you taken medications that affect  your immune system, such as prednisone, other steroids, or anticancer drugs; drugs for the treatment of rheumatoid arthritis, Crohn s disease, or psoriasis; or have you had radiation treatments?   No   Have you had a seizure, or a brain or other nervous system problem?   No   During the past year, have you received a transfusion of blood or blood    products, or been given immune (gamma) globulin or antiviral drug?   No   For women: Are you pregnant or is there a chance you could become       pregnant during the next month?   No   Have you received any vaccinations in the past 4 weeks?   No     Immunization questionnaire answers were all negative.      Patient instructed to remain in clinic for 15 minutes afterwards, and to report any adverse reactions.     Screening performed by Fang Parks MA on 7/22/2025 at 9:36 AM.
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
## 2025-07-22 NOTE — PROGRESS NOTES
Assessment & Plan     Pelvic pain  Ruptured ovarian cyst s/p ex lap and cauterization 4/30/24  UA negative for signs of infection, no culture indicated.   Given history of ruptured ovarian cyst will obtain pelvic ultrasound as below, if severe worsening pain, fevers, nausea, vomiting should be seen in ER.   - UA Macroscopic with reflex to Microscopic and Culture - Lab Collect  - UA Macroscopic with reflex to Microscopic and Culture - Lab Collect  - Ob/Gyn  Referral  - US Pelvic Complete with Transvaginal  - HCG qualitative urine    Screening examination for venereal disease  Labs pending, will follow-up with results and any treatment necessary via myChart.   - Vaginal-Chlamydia trachomatis/Neisseria gonorrhoeae by PCR  - HIV Antigen Antibody Combo Cascade  - Treponema Abs w Reflex to RPR and Titer  - Vagina-Wet preparation  - Vaginal-Chlamydia trachomatis/Neisseria gonorrhoeae by PCR  - HIV Antigen Antibody Combo Cascade  - Treponema Abs w Reflex to RPR and Titer  - Vagina-Wet preparation    Fibromyalgia  Refilled meloxicam as prescribed, checking BMP.   - Basic metabolic panel  (Ca, Cl, CO2, Creat, Gluc, K, Na, BUN)  - meloxicam (MOBIC) 15 MG tablet  Dispense: 30 tablet; Refill: 0  - Basic metabolic panel  (Ca, Cl, CO2, Creat, Gluc, K, Na, BUN)    Weight gain  TSH pending.   - TSH with free T4 reflex  - TSH with free T4 reflex    Encounter for immunization  - TDAP 10-64Y (ADACEL,BOOSTRIX)        Subjective   Marck is a 29 year old, presenting for the following health issues:  Pelvic Pain (Pressure like feeling ) and STD (Testing)    STD    History of Present Illness       Reason for visit:  Periodic pelvic pain std test and tdap vaccine    Marck eats 2-3 servings of fruits and vegetables daily.Marck consumes 2 sweetened beverage(s) daily.Marck exercises with enough effort to increase Marck's heart rate 30 to 60 minutes per day.  Marck exercises with enough effort to increase Marck's heart rate 5 days per week.  "  Marck is taking medications regularly.        Last week- started having dysuria and increased frequency of urination so they took macrobid course prescribed by PCP (did not have urine sample)    Saturday 7/19 woke up with lower mid abdomen pain/pressure- lasted about 3 hours    Last April had ovarian cyst rupture- had surgery   Cyst ended up growing back   Followed with ob/gyn who recommended OCP     Also wanting routine STI testing considering they have had new sexual partners  No symptoms or known exposures.       Weight gain-- wanting thyroid checked  Exercising and notes could be muscle related weight gain, but has health related anxiety regarding thyroid  No fam hx of thyroid related disorders        Objective    /78 (BP Location: Right arm, Patient Position: Sitting, Cuff Size: Adult Regular)   Pulse 68   Temp 97.2  F (36.2  C) (Temporal)   Resp 18   Ht 1.66 m (5' 5.35\")   Wt 73 kg (160 lb 14.4 oz)   LMP 06/28/2025 (Exact Date)   SpO2 100%   BMI 26.49 kg/m    Body mass index is 26.49 kg/m .  Physical Exam   GENERAL: alert and no distress  CV: regular rate and rhythm, normal S1 S2, no S3 or S4, no murmur, click or rub  ABDOMEN: soft, nontender, no hepatosplenomegaly, no masses and bowel sounds normal  MS: no gross musculoskeletal defects noted  NEURO: mentation intact and speech normal  PSYCH: mentation appears normal, affect normal/bright    Results for orders placed or performed in visit on 07/22/25   UA Macroscopic with reflex to Microscopic and Culture - Lab Collect     Status: Normal    Specimen: Urine, NOS   Result Value Ref Range    Color Urine Yellow Colorless, Straw, Light Yellow, Yellow    Appearance Urine Clear Clear    Glucose Urine Negative Negative mg/dL    Bilirubin Urine Negative Negative    Ketones Urine Negative Negative mg/dL    Specific Gravity Urine 1.025 1.003 - 1.035    Blood Urine Negative Negative    pH Urine 6.0 5.0 - 7.0    Protein Albumin Urine Negative Negative mg/dL "    Urobilinogen Urine 0.2 0.2, 1.0 E.U./dL    Nitrite Urine Negative Negative    Leukocyte Esterase Urine Negative Negative    Narrative    Microscopic not indicated     Recent Results (from the past 24 hours)   UA Macroscopic with reflex to Microscopic and Culture - Lab Collect    Specimen: Urine, NOS   Result Value Ref Range    Color Urine Yellow Colorless, Straw, Light Yellow, Yellow    Appearance Urine Clear Clear    Glucose Urine Negative Negative mg/dL    Bilirubin Urine Negative Negative    Ketones Urine Negative Negative mg/dL    Specific Gravity Urine 1.025 1.003 - 1.035    Blood Urine Negative Negative    pH Urine 6.0 5.0 - 7.0    Protein Albumin Urine Negative Negative mg/dL    Urobilinogen Urine 0.2 0.2, 1.0 E.U./dL    Nitrite Urine Negative Negative    Leukocyte Esterase Urine Negative Negative    Narrative    Microscopic not indicated           Signed Electronically by: SHARI Mcelroy CNP

## 2025-07-22 NOTE — PATIENT INSTRUCTIONS
Safer Sex: Care Instructions  Overview  Safer sex is a way to reduce your risk of getting a sexually transmitted infection (STI). It can also help prevent pregnancy.  Several products can help you practice safer sex and reduce your chance of STIs. One of the best is a condom. There are internal and external condoms. You can use a special rubber sheet (dental dam) for protection during oral sex. Disposable gloves can keep your hands from touching blood, semen, or other body fluids that can carry infections.  Remember that birth control methods such as diaphragms, IUDs, foams, and birth control pills do not stop you from getting STIs.  Follow-up care is a key part of your treatment and safety. Be sure to make and go to all appointments, and call your doctor if you are having problems. It's also a good idea to know your test results and keep a list of the medicines you take.  How can you care for yourself at home?  Think about getting vaccinated to help prevent hepatitis A, hepatitis B, and human papillomavirus (HPV). They can be spread through sex.  Use a condom every time you have sex. Use an external condom, which goes on the penis. Or use an internal condom, which goes into the vagina or anus.  Make sure you use the right size external condom. A condom that's too small can break easily. A condom that's too big can slip off during sex.  Use a new condom each time you have sex. Be careful not to poke a hole in the condom when you open the wrapper.  Don't use an internal condom and an external condom at the same time.  Never use petroleum jelly (such as Vaseline), grease, hand lotion, baby oil, or anything with oil in it. These products can make holes in the condom.  After intercourse, hold the edge of the condom as you remove it. This will help keep semen from spilling out of the condom.  Do not have sex with anyone who has symptoms of an STI, such as sores on the genitals or mouth.  Do not drink a lot of alcohol or  "use drugs before sex.  Limit your sex partners. Sex with one partner who has sex only with you can reduce your risk of getting an STI.  Don't share sex toys. But if you do share them, use a condom and clean the sex toys between each use.  Talk to any partners before you have sex. Talk about what you feel comfortable with and whether you have any boundaries with sex. And find out if your partner or partners may be at risk for any STI. Keep in mind that a person may be able to spread an STI even if they do not have symptoms. You and any partners may want to get tested for STIs.  Where can you learn more?  Go to https://www.[employer].net/patiented  Enter B608 in the search box to learn more about \"Safer Sex: Care Instructions.\"  Current as of: April 30, 2024  Content Version: 14.5 2024-2025 [employer].   Care instructions adapted under license by your healthcare professional. If you have questions about a medical condition or this instruction, always ask your healthcare professional. [employer] disclaims any warranty or liability for your use of this information.    "

## 2025-07-23 ENCOUNTER — PATIENT OUTREACH (OUTPATIENT)
Dept: CARE COORDINATION | Facility: CLINIC | Age: 30
End: 2025-07-23
Payer: COMMERCIAL

## 2025-07-23 LAB
C TRACH DNA SPEC QL PROBE+SIG AMP: NEGATIVE
N GONORRHOEA DNA SPEC QL NAA+PROBE: NEGATIVE
SPECIMEN TYPE: NORMAL

## 2025-07-27 ENCOUNTER — OFFICE VISIT (OUTPATIENT)
Dept: URGENT CARE | Facility: URGENT CARE | Age: 30
End: 2025-07-27
Payer: COMMERCIAL

## 2025-07-27 VITALS
HEIGHT: 65 IN | HEART RATE: 72 BPM | OXYGEN SATURATION: 95 % | SYSTOLIC BLOOD PRESSURE: 127 MMHG | WEIGHT: 160 LBS | TEMPERATURE: 98.2 F | RESPIRATION RATE: 18 BRPM | BODY MASS INDEX: 26.66 KG/M2 | DIASTOLIC BLOOD PRESSURE: 84 MMHG

## 2025-07-27 DIAGNOSIS — J11.1 INFLUENZA-LIKE ILLNESS: ICD-10-CM

## 2025-07-27 DIAGNOSIS — R53.83 OTHER FATIGUE: Primary | ICD-10-CM

## 2025-07-27 PROCEDURE — 87637 SARSCOV2&INF A&B&RSV AMP PRB: CPT | Performed by: FAMILY MEDICINE

## 2025-07-27 PROCEDURE — 99213 OFFICE O/P EST LOW 20 MIN: CPT | Performed by: FAMILY MEDICINE

## 2025-07-27 NOTE — PROGRESS NOTES
Urgent Care Clinic Visit    Chief Complaint   Patient presents with    Urgent Care    Fatigue     Body aches, vomiting today only, headaches, nasal congestion, sore throat. Symptoms started  3 days ago               7/27/2025    11:28 AM   Additional Questions   Roomed by Margaret Marie CMA     Does the patient have a sore throat and either history of fever >100.4 in the previous 24 hours or recent exposure to a known case of strep throat? No       Margaret Marie MA on 7/27/2025 at 11:40 AM

## 2025-07-28 ENCOUNTER — TELEPHONE (OUTPATIENT)
Dept: FAMILY MEDICINE | Facility: CLINIC | Age: 30
End: 2025-07-28
Payer: COMMERCIAL

## 2025-07-28 LAB
FLUAV RNA SPEC QL NAA+PROBE: NEGATIVE
FLUBV RNA RESP QL NAA+PROBE: NEGATIVE
RSV RNA SPEC NAA+PROBE: NEGATIVE
SARS-COV-2 RNA RESP QL NAA+PROBE: NEGATIVE

## 2025-07-28 NOTE — TELEPHONE ENCOUNTER
Spoke to pt to let her know her flu/rsv/covid combo swab is not resulted as yet. Will call back later once results are back.   Manuelito Cottrell MA on 7/28/2025 at 12:24 PM

## 2025-07-28 NOTE — TELEPHONE ENCOUNTER
Test Results        Who ordered the test:  Dr. Quinteros    Type of test: Lab    Date of test:  7/27/25    Where was the test performed:  [facility]     What are your questions/concerns?:  Patient is wanting test results from urgent care visit     Could we send this information to you in [facility] or would you prefer to receive a phone call?:   Patient would prefer a phone call   Okay to leave a detailed message?: Yes at Cell number on file:    Telephone Information:   Mobile [phone]

## 2025-08-02 ASSESSMENT — ENCOUNTER SYMPTOMS
HEADACHES: 1
FATIGUE: 1

## 2025-08-02 NOTE — PROGRESS NOTES
"Rooming Notes:    Patient presents with:  Urgent Care  Fatigue: Body aches, vomiting today only, headaches, nasal congestion, sore throat. Symptoms started  3 days ago        Physician Note:    Assessment/MDM:    Marck Fernandez is a 29 year old adult is here today for viral-like illness.  Possible COVID, will test follow-up and supportive care.   HPI:  Fatigue  This is a new problem. The current episode started in the past 7 days. The problem occurs constantly. The problem has been unchanged. Associated symptoms include congestion, fatigue and headaches.        Review of Systems   Constitutional:  Positive for fatigue.   HENT:  Positive for congestion.    Neurological:  Positive for headaches.   All other systems reviewed and are negative.      Vitals:    07/27/25 1135   BP: 127/84   BP Location: Right arm   Patient Position: Sitting   Cuff Size: Adult Regular   Pulse: 72   Resp: 18   Temp: 98.2  F (36.8  C)   TempSrc: Temporal   SpO2: 95%   Weight: 72.6 kg (160 lb)   Height: 1.657 m (5' 5.25\")       Physical Exam  Vitals and nursing note reviewed.   Cardiovascular:      Rate and Rhythm: Normal rate.   Pulmonary:      Effort: Pulmonary effort is normal.   Neurological:      Mental Status: Marck is alert.         Results:  Results for orders placed or performed in visit on 07/27/25   Influenza A/B, RSV and SARS-CoV2 PCR (COVID-19) Nasopharyngeal     Status: Normal    Specimen: Nasopharyngeal; Swab   Result Value Ref Range    Influenza A PCR Negative Negative    Influenza B PCR Negative Negative    RSV PCR Negative Negative    SARS CoV2 PCR Negative Negative    Narrative    Testing was performed using the Xpert Xpress CoV2/Flu/RSV Assay on the [employer] GeneXpert Instrument. This test should be ordered for the detection of SARS-CoV2, influenza, and RSV viruses in individuals with signs and symptoms of respiratory tract infection. This test is for in vitro diagnostic use under the [place] FDA for laboratories certified under " CLIA to perform high or moderate complexity testing. This test has been [place] FDA cleared. A negative result does not rule out the presence of PCR inhibitors in the specimen or target RNA in concentration below the limit of detection for the assay. If only one viral target is positive but coinfection with multiple targets is suspected, the sample should be re-tested with another FDA cleared, approved, or authorized test, if coninfection would change clinical management. This test was validated by the [facility]. These laboratories are certified under the Clinical Laboratory Improvement Amendments of 1988 (CLIA-88) as qualified to perfom high complexity laboratory testing.           Past Medical History: has been reviewed by me. I have also reviewed past visits, lab results and studies  Adverse Drug Reactions: Codeine    Medications: reviewed by me today    Family History: Reviewed by me today  Social History:   Social History     Tobacco Use    Smoking status: Never     Passive exposure: Never    Smokeless tobacco: Never   Substance Use Topics    Alcohol use: Never       Tobacco:   History   Smoking Status    Never   Smokeless Tobacco    Never         I have reviewed and recommended any over-the-counter medications that will aid in the symptomatic relief of this illness.    The risk of complications, morbidity, and/or mortality of patient management decisions were made during the visit with the patient. These may be associated with the patient s problems, the diagnostic procedures, or the treatment. This includes possible management options selected, as well options considered but ultimately not selected, after shared medical decision making with the patient and/or family.        ICD-10-CM    1. Other fatigue  R53.83 Influenza A/B, RSV and SARS-CoV2 PCR (COVID-19)     Influenza A/B, RSV and SARS-CoV2 PCR (COVID-19) Nasopharyngeal      2. Influenza-like illness  J11.1 Influenza A/B, RSV and SARS-CoV2  PCR (COVID-19)     Influenza A/B, RSV and SARS-CoV2 PCR (COVID-19) Nasopharyngeal           Happy Aldair ORTIZ  8/2/2025, 3:29 PM.      Patient Instructions   Check my chart for results , treat based on them

## 2025-08-07 ENCOUNTER — ANCILLARY PROCEDURE (OUTPATIENT)
Dept: ULTRASOUND IMAGING | Facility: CLINIC | Age: 30
End: 2025-08-07
Payer: COMMERCIAL

## 2025-08-07 DIAGNOSIS — N83.209 RUPTURED OVARIAN CYST: ICD-10-CM

## 2025-08-07 DIAGNOSIS — R10.2 PELVIC PAIN: ICD-10-CM

## 2025-08-07 PROCEDURE — 76856 US EXAM PELVIC COMPLETE: CPT | Performed by: RADIOLOGY

## 2025-08-07 PROCEDURE — 76830 TRANSVAGINAL US NON-OB: CPT | Performed by: RADIOLOGY

## 2025-08-25 ENCOUNTER — MYC MEDICAL ADVICE (OUTPATIENT)
Dept: FAMILY MEDICINE | Facility: CLINIC | Age: 30
End: 2025-08-25
Payer: COMMERCIAL

## 2025-08-25 DIAGNOSIS — N89.8 VAGINAL DISCHARGE: Primary | ICD-10-CM

## 2025-08-26 RX ORDER — FLUCONAZOLE 150 MG/1
150 TABLET ORAL ONCE
Qty: 1 TABLET | Refills: 0 | Status: SHIPPED | OUTPATIENT
Start: 2025-08-26 | End: 2025-08-26

## (undated) DEVICE — PANTIES MESH LG/XLG 2PK 706M2

## (undated) DEVICE — GLOVE BIOGEL PI MICRO SZ 7.0 48570

## (undated) DEVICE — Device

## (undated) DEVICE — PREP CHLORAPREP 26ML TINTED HI-LITE ORANGE 930815

## (undated) DEVICE — SYR BULB IRRIG DOVER 60 ML LATEX FREE 67000

## (undated) DEVICE — SUCTION IRR STRYKERFLOW II W/TIP 250-070-520

## (undated) DEVICE — JELLY LUBRICATING SURGILUBE 2OZ TUBE 0281-0205-02

## (undated) DEVICE — PAD CHUX UNDERPAD 30X36" P3036C

## (undated) DEVICE — STRAP KNEE/BODY 31143004

## (undated) DEVICE — SYR 10ML FINGER CONTROL W/O NDL 309695

## (undated) DEVICE — NDL 18GA 1.5" 305196

## (undated) DEVICE — ESU GROUND PAD UNIVERSAL W/O CORD

## (undated) DEVICE — ENDO TROCAR SLEEVE KII ADV FIXATION 05X100MM CFS02

## (undated) DEVICE — NDL INSUFFLATION 13GA 120MM C2201

## (undated) DEVICE — SOL NACL 0.9% INJ 1000ML BAG 2B1324X

## (undated) DEVICE — PREP DYNA-HEX 4% CHG SCRUB 4OZ BOTTLE MDS098710

## (undated) DEVICE — COVER CAMERA IN-LIGHT DISP LT-C02

## (undated) DEVICE — ENDO TROCAR FIRST ENTRY KII FIOS ADV FIX 05X100MM CFF03

## (undated) DEVICE — NEEDLE SPINAL DISP 22GA X 3.5" QUINCKE 333320

## (undated) DEVICE — ENDO SCOPE WARMER LF TM500

## (undated) DEVICE — ENDO POUCH UNIVERSAL RETRIEVAL SYSTEM INZII 5MM CD003

## (undated) DEVICE — GLOVE BIOGEL PI MICRO INDICATOR UNDERGLOVE SZ 7.5 48975

## (undated) DEVICE — TUBING SMOKE EVAC PNEUMOCLEAR HIGH FLOW 0620050250

## (undated) DEVICE — SUCTION MANIFOLD NEPTUNE 2 SYS 4 PORT 0702-020-000

## (undated) DEVICE — LINEN ORTHO PACK 5446

## (undated) DEVICE — ESU HOLDER LAP INST DISP PURPLE LONG 330MM H-PRO-330

## (undated) DEVICE — RX SURGIFLO HEMOSTATIC MATRIX W/THROMBIN 8ML 2994

## (undated) DEVICE — LINEN GOWN X4 5410

## (undated) DEVICE — SU DERMABOND ADVANCED .7ML DNX12

## (undated) DEVICE — PAD PERI INDIV WRAP 11" 2022A

## (undated) DEVICE — SOL WATER IRRIG 1000ML BOTTLE 2F7114

## (undated) DEVICE — SU MONOCRYL 4-0 PS-2 18" UND Y496G

## (undated) DEVICE — CATH TRAY FOLEY SURESTEP 16FR WDRAIN BAG STLK LATEX A300316A

## (undated) DEVICE — ENDO POUCH UNIV RETRIEVAL SYSTEM INZII 10MM CD001

## (undated) DEVICE — SOL NACL 0.9% IRRIG 1000ML BOTTLE 2F7124

## (undated) RX ORDER — ONDANSETRON 2 MG/ML
INJECTION INTRAMUSCULAR; INTRAVENOUS
Status: DISPENSED
Start: 2024-04-30

## (undated) RX ORDER — PROPOFOL 10 MG/ML
INJECTION, EMULSION INTRAVENOUS
Status: DISPENSED
Start: 2024-04-30

## (undated) RX ORDER — HYDROMORPHONE HYDROCHLORIDE 1 MG/ML
INJECTION, SOLUTION INTRAMUSCULAR; INTRAVENOUS; SUBCUTANEOUS
Status: DISPENSED
Start: 2024-04-30

## (undated) RX ORDER — BUPIVACAINE HYDROCHLORIDE 2.5 MG/ML
INJECTION, SOLUTION INFILTRATION; PERINEURAL
Status: DISPENSED
Start: 2024-04-30

## (undated) RX ORDER — FENTANYL CITRATE 50 UG/ML
INJECTION, SOLUTION INTRAMUSCULAR; INTRAVENOUS
Status: DISPENSED
Start: 2024-04-30